# Patient Record
Sex: FEMALE | Race: WHITE | NOT HISPANIC OR LATINO | Employment: FULL TIME | URBAN - METROPOLITAN AREA
[De-identification: names, ages, dates, MRNs, and addresses within clinical notes are randomized per-mention and may not be internally consistent; named-entity substitution may affect disease eponyms.]

---

## 2020-07-08 ENCOUNTER — HISTORICAL (OUTPATIENT)
Dept: URGENT CARE | Facility: CLINIC | Age: 20
End: 2020-07-08

## 2020-07-17 ENCOUNTER — HISTORICAL (OUTPATIENT)
Dept: URGENT CARE | Facility: CLINIC | Age: 20
End: 2020-07-17

## 2020-07-17 LAB
ABS NEUT (OLG): 2.24 X10(3)/MCL (ref 2.1–9.2)
ALBUMIN SERPL-MCNC: 3.9 GM/DL (ref 3.5–5)
ALBUMIN/GLOB SERPL: 1.1 RATIO (ref 1.1–2)
ALP SERPL-CCNC: 64 UNIT/L (ref 40–150)
ALT SERPL-CCNC: 12 UNIT/L (ref 0–55)
AST SERPL-CCNC: 17 UNIT/L (ref 5–34)
BASOPHILS # BLD AUTO: 0.1 X10(3)/MCL (ref 0–0.2)
BASOPHILS NFR BLD AUTO: 1 %
BILIRUB SERPL-MCNC: 0.2 MG/DL
BILIRUB SERPL-MCNC: NEGATIVE MG/DL
BILIRUBIN DIRECT+TOT PNL SERPL-MCNC: 0.1 MG/DL (ref 0–0.5)
BILIRUBIN DIRECT+TOT PNL SERPL-MCNC: 0.1 MG/DL (ref 0–0.8)
BLOOD URINE, POC: NEGATIVE
BUN SERPL-MCNC: 8.6 MG/DL (ref 7–18.7)
CALCIUM SERPL-MCNC: 9.3 MG/DL (ref 8.4–10.2)
CHLORIDE SERPL-SCNC: 103 MMOL/L (ref 98–107)
CLARITY, POC UA: CLEAR
CO2 SERPL-SCNC: 26 MMOL/L (ref 22–29)
COLOR, POC UA: YELLOW
CREAT SERPL-MCNC: 0.68 MG/DL (ref 0.55–1.02)
EOSINOPHIL # BLD AUTO: 0.4 X10(3)/MCL (ref 0–0.9)
EOSINOPHIL NFR BLD AUTO: 8 %
ERYTHROCYTE [DISTWIDTH] IN BLOOD BY AUTOMATED COUNT: 12 % (ref 11.5–17)
GLOBULIN SER-MCNC: 3.4 GM/DL (ref 2.4–3.5)
GLUCOSE SERPL-MCNC: 72 MG/DL (ref 74–100)
GLUCOSE UR QL STRIP: NEGATIVE
HCT VFR BLD AUTO: 41.9 % (ref 37–47)
HGB BLD-MCNC: 13.5 GM/DL (ref 12–16)
HIV 1+2 AB+HIV1 P24 AG SERPL QL IA: NONREACTIVE
KETONES UR QL STRIP: NEGATIVE
LEUKOCYTE EST, POC UA: NEGATIVE
LYMPHOCYTES # BLD AUTO: 1.8 X10(3)/MCL (ref 0.6–4.6)
LYMPHOCYTES NFR BLD AUTO: 36 %
MCH RBC QN AUTO: 28.2 PG (ref 27–31)
MCHC RBC AUTO-ENTMCNC: 32.2 GM/DL (ref 33–36)
MCV RBC AUTO: 87.7 FL (ref 80–94)
MONOCYTES # BLD AUTO: 0.5 X10(3)/MCL (ref 0.1–1.3)
MONOCYTES NFR BLD AUTO: 10 %
NEUTROPHILS # BLD AUTO: 2.24 X10(3)/MCL (ref 2.1–9.2)
NEUTROPHILS NFR BLD AUTO: 44 %
NITRITE, POC UA: NEGATIVE
PH, POC UA: 6
PLATELET # BLD AUTO: 294 X10(3)/MCL (ref 130–400)
PMV BLD AUTO: 9.7 FL (ref 9.4–12.4)
POTASSIUM SERPL-SCNC: 4.1 MMOL/L (ref 3.5–5.1)
PROT SERPL-MCNC: 7.3 GM/DL (ref 6.4–8.3)
PROTEIN, POC: NEGATIVE
RBC # BLD AUTO: 4.78 X10(6)/MCL (ref 4.2–5.4)
SODIUM SERPL-SCNC: 138 MMOL/L (ref 136–145)
SPECIFIC GRAVITY, POC UA: 1.01
TSH SERPL-ACNC: 1.04 UIU/ML (ref 0.35–4.94)
UROBILINOGEN, POC UA: NORMAL
WBC # SPEC AUTO: 5 X10(3)/MCL (ref 4.5–11.5)

## 2020-11-17 LAB — RAPID GROUP A STREP (OHS): NEGATIVE

## 2021-04-15 LAB
RAPID GROUP A STREP (OHS): NEGATIVE
SARS-COV-2 RNA RESP QL NAA+PROBE: NEGATIVE

## 2021-06-30 ENCOUNTER — HISTORICAL (OUTPATIENT)
Dept: ADMINISTRATIVE | Facility: HOSPITAL | Age: 21
End: 2021-06-30

## 2021-06-30 LAB — SARS-COV-2 RNA RESP QL NAA+PROBE: NEGATIVE

## 2021-07-06 ENCOUNTER — HISTORICAL (OUTPATIENT)
Dept: ADMINISTRATIVE | Facility: HOSPITAL | Age: 21
End: 2021-07-06

## 2021-07-06 LAB — SARS-COV-2 RNA RESP QL NAA+PROBE: POSITIVE

## 2021-10-04 ENCOUNTER — HISTORICAL (OUTPATIENT)
Dept: ADMINISTRATIVE | Facility: HOSPITAL | Age: 21
End: 2021-10-04

## 2021-12-18 LAB
INFLUENZA A ANTIGEN, POC: NEGATIVE
INFLUENZA B ANTIGEN, POC: NEGATIVE
RAPID GROUP A STREP (OHS): POSITIVE
SARS-COV-2 RNA RESP QL NAA+PROBE: NEGATIVE

## 2022-04-10 ENCOUNTER — HISTORICAL (OUTPATIENT)
Dept: ADMINISTRATIVE | Facility: HOSPITAL | Age: 22
End: 2022-04-10

## 2022-04-26 VITALS
DIASTOLIC BLOOD PRESSURE: 83 MMHG | BODY MASS INDEX: 19.47 KG/M2 | HEIGHT: 62 IN | WEIGHT: 105.81 LBS | OXYGEN SATURATION: 99 % | SYSTOLIC BLOOD PRESSURE: 117 MMHG

## 2022-06-17 ENCOUNTER — OFFICE VISIT (OUTPATIENT)
Dept: URGENT CARE | Facility: CLINIC | Age: 22
End: 2022-06-17
Payer: COMMERCIAL

## 2022-06-17 VITALS
RESPIRATION RATE: 18 BRPM | WEIGHT: 105 LBS | HEIGHT: 62 IN | TEMPERATURE: 99 F | BODY MASS INDEX: 19.32 KG/M2 | DIASTOLIC BLOOD PRESSURE: 85 MMHG | HEART RATE: 82 BPM | OXYGEN SATURATION: 97 % | SYSTOLIC BLOOD PRESSURE: 126 MMHG

## 2022-06-17 DIAGNOSIS — Z20.828 RSV EXPOSURE: ICD-10-CM

## 2022-06-17 DIAGNOSIS — J02.9 SORE THROAT: Primary | ICD-10-CM

## 2022-06-17 LAB
CTP QC/QA: YES
FLUAV AG NPH QL: NEGATIVE
FLUBV AG NPH QL: NEGATIVE
S PYO RRNA THROAT QL PROBE: NEGATIVE
SARS-COV-2 RDRP RESP QL NAA+PROBE: NEGATIVE

## 2022-06-17 PROCEDURE — 1160F PR REVIEW ALL MEDS BY PRESCRIBER/CLIN PHARMACIST DOCUMENTED: ICD-10-PCS | Mod: CPTII,,, | Performed by: FAMILY MEDICINE

## 2022-06-17 PROCEDURE — U0002 COVID-19 LAB TEST NON-CDC: HCPCS | Mod: QW,,, | Performed by: FAMILY MEDICINE

## 2022-06-17 PROCEDURE — 1160F RVW MEDS BY RX/DR IN RCRD: CPT | Mod: CPTII,,, | Performed by: FAMILY MEDICINE

## 2022-06-17 PROCEDURE — 87880 POCT RAPID STREP A: ICD-10-PCS | Mod: QW,,, | Performed by: FAMILY MEDICINE

## 2022-06-17 PROCEDURE — 87804 INFLUENZA ASSAY W/OPTIC: CPT | Mod: QW,,, | Performed by: FAMILY MEDICINE

## 2022-06-17 PROCEDURE — 3008F BODY MASS INDEX DOCD: CPT | Mod: CPTII,,, | Performed by: FAMILY MEDICINE

## 2022-06-17 PROCEDURE — 3074F SYST BP LT 130 MM HG: CPT | Mod: CPTII,,, | Performed by: FAMILY MEDICINE

## 2022-06-17 PROCEDURE — 1159F PR MEDICATION LIST DOCUMENTED IN MEDICAL RECORD: ICD-10-PCS | Mod: CPTII,,, | Performed by: FAMILY MEDICINE

## 2022-06-17 PROCEDURE — 3008F PR BODY MASS INDEX (BMI) DOCUMENTED: ICD-10-PCS | Mod: CPTII,,, | Performed by: FAMILY MEDICINE

## 2022-06-17 PROCEDURE — 99213 OFFICE O/P EST LOW 20 MIN: CPT | Mod: 25,,, | Performed by: FAMILY MEDICINE

## 2022-06-17 PROCEDURE — 96372 THER/PROPH/DIAG INJ SC/IM: CPT | Mod: ,,, | Performed by: FAMILY MEDICINE

## 2022-06-17 PROCEDURE — 3074F PR MOST RECENT SYSTOLIC BLOOD PRESSURE < 130 MM HG: ICD-10-PCS | Mod: CPTII,,, | Performed by: FAMILY MEDICINE

## 2022-06-17 PROCEDURE — 96372 PR INJECTION,THERAP/PROPH/DIAG2ST, IM OR SUBCUT: ICD-10-PCS | Mod: ,,, | Performed by: FAMILY MEDICINE

## 2022-06-17 PROCEDURE — 3079F PR MOST RECENT DIASTOLIC BLOOD PRESSURE 80-89 MM HG: ICD-10-PCS | Mod: CPTII,,, | Performed by: FAMILY MEDICINE

## 2022-06-17 PROCEDURE — 1159F MED LIST DOCD IN RCRD: CPT | Mod: CPTII,,, | Performed by: FAMILY MEDICINE

## 2022-06-17 PROCEDURE — 3079F DIAST BP 80-89 MM HG: CPT | Mod: CPTII,,, | Performed by: FAMILY MEDICINE

## 2022-06-17 PROCEDURE — 99213 PR OFFICE/OUTPT VISIT, EST, LEVL III, 20-29 MIN: ICD-10-PCS | Mod: 25,,, | Performed by: FAMILY MEDICINE

## 2022-06-17 PROCEDURE — U0002: ICD-10-PCS | Mod: QW,,, | Performed by: FAMILY MEDICINE

## 2022-06-17 PROCEDURE — 87804 POCT INFLUENZA A/B: ICD-10-PCS | Mod: QW,,, | Performed by: FAMILY MEDICINE

## 2022-06-17 PROCEDURE — 87880 STREP A ASSAY W/OPTIC: CPT | Mod: QW,,, | Performed by: FAMILY MEDICINE

## 2022-06-17 RX ORDER — FLUCONAZOLE 150 MG/1
1 TABLET ORAL DAILY
COMMUNITY
Start: 2022-06-14

## 2022-06-17 RX ORDER — RIMEGEPANT SULFATE 75 MG/75MG
75 TABLET, ORALLY DISINTEGRATING ORAL DAILY PRN
COMMUNITY
Start: 2022-03-22 | End: 2023-01-18

## 2022-06-17 RX ORDER — AMITRIPTYLINE HYDROCHLORIDE 10 MG/1
10 TABLET, FILM COATED ORAL NIGHTLY
COMMUNITY
Start: 2022-05-26

## 2022-06-17 RX ORDER — NORETHINDRONE ACETATE AND ETHINYL ESTRADIOL, ETHINYL ESTRADIOL AND FERROUS FUMARATE 1MG-10(24)
1 KIT ORAL DAILY
COMMUNITY
Start: 2022-05-10

## 2022-06-17 RX ORDER — DEXAMETHASONE SODIUM PHOSPHATE 100 MG/10ML
8 INJECTION INTRAMUSCULAR; INTRAVENOUS
Status: COMPLETED | OUTPATIENT
Start: 2022-06-17 | End: 2022-06-17

## 2022-06-17 RX ADMIN — DEXAMETHASONE SODIUM PHOSPHATE 8 MG: 100 INJECTION INTRAMUSCULAR; INTRAVENOUS at 12:06

## 2022-06-17 NOTE — PATIENT INSTRUCTIONS
Strep negative flu negative COVID negative    Start taking an allergy pill daily such as claritin, zyrtec, allegrea or xyzal. Also start using a nasal steroid spray such as flonase or nasacort daily. If you are not being treated for high blood pressure, you can also take decongestant such as sudafed as needed. They can be purchased over the counter.Monitor for fever. Take tylenol/acetaminophen or ibuprofen as needed. Rest and hydrate. If symptoms persist or worsen, return to clinic or seek medical attention immediately.

## 2022-06-17 NOTE — PROGRESS NOTES
"Subjective:       Patient ID: Oskar Cao is a 21 y.o. female.    Vitals:  height is 5' 2.21" (1.58 m) and weight is 47.6 kg (105 lb). Her oral temperature is 98.7 °F (37.1 °C). Her blood pressure is 126/85 and her pulse is 82. Her respiration is 18 and oxygen saturation is 97%.     Chief Complaint: Sore Throat (X 2 days, exposure to strep and flu at ), Headache (X 2 days), Nasal Congestion (X 2 days), Cough (X 2 days), and Fever ((Tmax 99.8) x 2 days)    21 y.o. female presents to clinic c/o HA, sore throat, nasal congestion, cough, fever(tmax99.8) X 3 days, exposure to strep and flu at .  Also reports diarrhea.  Denies any shortness of breath or wheezing.    Sore Throat   This is a new problem. The current episode started in the past 7 days. The problem has been unchanged. The pain is moderate. Associated symptoms include coughing and headaches. She has had exposure to strep. She has tried nothing for the symptoms.   Headache   This is a new problem. The current episode started in the past 7 days. The problem occurs constantly. The problem has been unchanged. The pain is located in the bilateral region. The quality of the pain is described as aching. The pain is mild. Associated symptoms include coughing, a fever and a sore throat.   Cough  This is a new problem. The current episode started in the past 7 days. The problem has been unchanged. The problem occurs every few minutes. The cough is non-productive. Associated symptoms include a fever, headaches and a sore throat. She has tried nothing for the symptoms. Her past medical history is significant for asthma.   Fever   This is a new problem. The current episode started in the past 7 days. The problem occurs intermittently. The problem has been waxing and waning. The maximum temperature noted was 99 to 99.9 F. Associated symptoms include coughing, headaches and a sore throat.       Constitution: Positive for fever.   HENT: Positive for sore " throat.    Cardiovascular: Negative.    Eyes: Negative.    Respiratory: Positive for cough.    Gastrointestinal: Negative.    Genitourinary: Negative.    Musculoskeletal: Negative.    Skin: Negative.    Allergic/Immunologic: Negative.    Neurological: Positive for headaches.   Hematologic/Lymphatic: Negative.        Objective:      Physical Exam   Constitutional: She is oriented to person, place, and time.   HENT:   Head: Normocephalic and atraumatic.   Ears:   Right Ear: Tympanic membrane and external ear normal.   Left Ear: Tympanic membrane and external ear normal.   Mouth/Throat: No posterior oropharyngeal erythema (Postnasal drip is present).   Eyes: Conjunctivae are normal.   Pulmonary/Chest: Effort normal and breath sounds normal. No respiratory distress. She has no wheezes. She has no rhonchi. She has no rales.   Abdominal: Normal appearance.   Neurological: She is alert and oriented to person, place, and time.   Psychiatric: Her behavior is normal. Mood, judgment and thought content normal.   Vitals reviewed.         Previous History      Review of patient's allergies indicates:   Allergen Reactions    Imitrex [sumatriptan]      stroke    Penicillin Hives    Latex Itching and Rash       Past Medical History:   Diagnosis Date    Anemia     Asthma      Current Outpatient Medications   Medication Instructions    amitriptyline (ELAVIL) 10 mg, Oral, Nightly    fluconazole (DIFLUCAN) 150 MG Tab 1 tablet, Oral, Daily    LO LOESTRIN FE 1 mg-10 mcg (24)/10 mcg (2) Tab 1 tablet, Oral, Daily    NURTEC 75 mg, Oral, Daily PRN     Past Surgical History:   Procedure Laterality Date    ADENOIDECTOMY      MYRINGOTOMY W/ TUBES      TONSILLECTOMY      WISDOM TOOTH EXTRACTION       Family History   Problem Relation Age of Onset    Hypertension Father        Social History     Tobacco Use    Smoking status: Never Smoker    Smokeless tobacco: Never Used   Substance Use Topics    Alcohol use: Not Currently         "Physical Exam      Vital Signs Reviewed   /85 (BP Location: Left arm, Patient Position: Sitting)   Pulse 82   Temp 98.7 °F (37.1 °C) (Oral)   Resp 18   Ht 5' 2.21" (1.58 m)   Wt 47.6 kg (105 lb)   LMP  (LMP Unknown)   SpO2 97%   BMI 19.08 kg/m²        Procedures    Procedures     Labs     Results for orders placed or performed in visit on 06/17/22   POCT COVID-19 Rapid Screening   Result Value Ref Range    POC Rapid COVID Negative Negative     Acceptable Yes    POCT Influenza A/B   Result Value Ref Range    Rapid Influenza A Ag Negative Negative    Rapid Influenza B Ag Negative Negative     Acceptable Yes    POCT rapid strep A   Result Value Ref Range    Rapid Strep A Screen Negative Negative     Acceptable Yes          Assessment:       1. Sore throat    2. RSV exposure          Plan:       Strep negative flu negative COVID negative    Start taking an allergy pill daily such as claritin, zyrtec, allegrea or xyzal. Also start using a nasal steroid spray such as flonase or nasacort daily. If you are not being treated for high blood pressure, you can also take decongestant such as sudafed as needed. They can be purchased over the counter.Monitor for fever. Take tylenol/acetaminophen or ibuprofen as needed. Rest and hydrate. If symptoms persist or worsen, return to clinic or seek medical attention immediately.       Sore throat  -     POCT COVID-19 Rapid Screening  -     POCT Influenza A/B  -     POCT rapid strep A    RSV exposure    Other orders  -     dexamethasone injection 8 mg                     "

## 2022-09-16 ENCOUNTER — OFFICE VISIT (OUTPATIENT)
Dept: URGENT CARE | Facility: CLINIC | Age: 22
End: 2022-09-16
Payer: COMMERCIAL

## 2022-09-16 VITALS
TEMPERATURE: 99 F | RESPIRATION RATE: 16 BRPM | OXYGEN SATURATION: 100 % | WEIGHT: 110 LBS | HEIGHT: 62 IN | DIASTOLIC BLOOD PRESSURE: 86 MMHG | BODY MASS INDEX: 20.24 KG/M2 | SYSTOLIC BLOOD PRESSURE: 128 MMHG | HEART RATE: 79 BPM

## 2022-09-16 DIAGNOSIS — J02.9 SORE THROAT: Primary | ICD-10-CM

## 2022-09-16 DIAGNOSIS — J06.9 ACUTE URI: ICD-10-CM

## 2022-09-16 PROCEDURE — 87804 POCT INFLUENZA A/B: ICD-10-PCS | Mod: QW,,, | Performed by: PHYSICIAN ASSISTANT

## 2022-09-16 PROCEDURE — 87804 INFLUENZA ASSAY W/OPTIC: CPT | Mod: QW,,, | Performed by: PHYSICIAN ASSISTANT

## 2022-09-16 PROCEDURE — 99213 OFFICE O/P EST LOW 20 MIN: CPT | Mod: 25,,, | Performed by: PHYSICIAN ASSISTANT

## 2022-09-16 PROCEDURE — 96372 THER/PROPH/DIAG INJ SC/IM: CPT | Mod: ,,, | Performed by: PHYSICIAN ASSISTANT

## 2022-09-16 PROCEDURE — 87880 POCT RAPID STREP A: ICD-10-PCS | Mod: QW,,, | Performed by: PHYSICIAN ASSISTANT

## 2022-09-16 PROCEDURE — 99213 PR OFFICE/OUTPT VISIT, EST, LEVL III, 20-29 MIN: ICD-10-PCS | Mod: 25,,, | Performed by: PHYSICIAN ASSISTANT

## 2022-09-16 PROCEDURE — U0002: ICD-10-PCS | Mod: QW,,, | Performed by: PHYSICIAN ASSISTANT

## 2022-09-16 PROCEDURE — 96372 PR INJECTION,THERAP/PROPH/DIAG2ST, IM OR SUBCUT: ICD-10-PCS | Mod: ,,, | Performed by: PHYSICIAN ASSISTANT

## 2022-09-16 PROCEDURE — 87880 STREP A ASSAY W/OPTIC: CPT | Mod: QW,,, | Performed by: PHYSICIAN ASSISTANT

## 2022-09-16 PROCEDURE — U0002 COVID-19 LAB TEST NON-CDC: HCPCS | Mod: QW,,, | Performed by: PHYSICIAN ASSISTANT

## 2022-09-16 RX ORDER — BETAMETHASONE SODIUM PHOSPHATE AND BETAMETHASONE ACETATE 3; 3 MG/ML; MG/ML
9 INJECTION, SUSPENSION INTRA-ARTICULAR; INTRALESIONAL; INTRAMUSCULAR; SOFT TISSUE
Status: COMPLETED | OUTPATIENT
Start: 2022-09-16 | End: 2022-09-16

## 2022-09-16 RX ADMIN — BETAMETHASONE SODIUM PHOSPHATE AND BETAMETHASONE ACETATE 9 MG: 3; 3 INJECTION, SUSPENSION INTRA-ARTICULAR; INTRALESIONAL; INTRAMUSCULAR; SOFT TISSUE at 03:09

## 2022-09-16 NOTE — PROGRESS NOTES
"Subjective:       Patient ID: Oskar Cao is a 21 y.o. female.    Vitals:  height is 5' 2" (1.575 m) and weight is 49.9 kg (110 lb). Her oral temperature is 98.7 °F (37.1 °C). Her blood pressure is 128/86 and her pulse is 79. Her respiration is 16 and oxygen saturation is 100%.     Chief Complaint: Sore Throat    Sore throat, coughing up mucus, congestion, and chills. X2 days .  Patient denies any neck stiffness rash shortness of breath or GI symptoms.  ROS    Objective:      Physical Exam   Constitutional: She is oriented to person, place, and time. She appears well-developed. She is cooperative.  Non-toxic appearance. She does not appear ill. No distress.   HENT:   Head: Normocephalic and atraumatic.   Ears:   Right Ear: Hearing, tympanic membrane, external ear and ear canal normal.   Left Ear: Hearing, tympanic membrane, external ear and ear canal normal.   Nose: Nose normal. No mucosal edema, rhinorrhea or nasal deformity. No epistaxis. Right sinus exhibits no maxillary sinus tenderness and no frontal sinus tenderness. Left sinus exhibits no maxillary sinus tenderness and no frontal sinus tenderness.   Mouth/Throat: Uvula is midline, oropharynx is clear and moist and mucous membranes are normal. No trismus in the jaw. Normal dentition. No uvula swelling. No oropharyngeal exudate, posterior oropharyngeal edema or posterior oropharyngeal erythema.   Eyes: Conjunctivae and lids are normal. No scleral icterus.   Neck: Trachea normal and phonation normal. Neck supple. No edema present. No erythema present. No neck rigidity present.   Cardiovascular: Normal rate, regular rhythm, normal heart sounds and normal pulses.   Pulmonary/Chest: Effort normal and breath sounds normal. No respiratory distress. She has no decreased breath sounds. She has no rhonchi.   Abdominal: Normal appearance.   Musculoskeletal: Normal range of motion.         General: No deformity. Normal range of motion.   Neurological: She is alert and " "oriented to person, place, and time. She exhibits normal muscle tone. Coordination normal.   Skin: Skin is warm, dry, intact, not diaphoretic and not pale.   Psychiatric: Her speech is normal and behavior is normal. Judgment and thought content normal.   Nursing note and vitals reviewed.             Previous History      Review of patient's allergies indicates:   Allergen Reactions    Imitrex [sumatriptan]      stroke    Penicillin Hives    Latex Itching and Rash       Past Medical History:   Diagnosis Date    Anemia     Asthma      Current Outpatient Medications   Medication Instructions    amitriptyline (ELAVIL) 10 mg, Oral, Nightly    fluconazole (DIFLUCAN) 150 MG Tab 1 tablet, Oral, Daily    LO LOESTRIN FE 1 mg-10 mcg (24)/10 mcg (2) Tab 1 tablet, Oral, Daily    NURTEC 75 mg, Oral, Daily PRN     Past Surgical History:   Procedure Laterality Date    ADENOIDECTOMY      MYRINGOTOMY W/ TUBES      TONSILLECTOMY      WISDOM TOOTH EXTRACTION       Family History   Problem Relation Age of Onset    Hypertension Father        Social History     Tobacco Use    Smoking status: Never    Smokeless tobacco: Never   Substance Use Topics    Alcohol use: Not Currently        Physical Exam      Vital Signs Reviewed   /86   Pulse 79   Temp 98.7 °F (37.1 °C) (Oral)   Resp 16   Ht 5' 2" (1.575 m)   Wt 49.9 kg (110 lb)   SpO2 100%   BMI 20.12 kg/m²        Procedures    Procedures     Labs     Results for orders placed or performed in visit on 09/16/22   POCT rapid strep A   Result Value Ref Range    Rapid Strep A Screen Negative Negative     Acceptable Yes    POCT Influenza A/B   Result Value Ref Range    Rapid Influenza A Ag Negative Negative    Rapid Influenza B Ag Negative Negative     Acceptable Yes    POCT COVID-19 Rapid Screening   Result Value Ref Range    POC Rapid COVID Negative Negative     Acceptable Yes      Assessment:       1. Sore throat    2. Acute URI      "     Plan:         Sore throat  -     POCT rapid strep A  -     POCT Influenza A/B  -     POCT COVID-19 Rapid Screening    Acute URI  -     betamethasone acetate-betamethasone sodium phosphate injection 9 mg       Drink plenty of fluids    Get plenty of rest.    Follow-up with your primary care doctor      Go to emergency department with any significant change or worsening symptoms.    Tylenol or Motrin as needed for fever.

## 2022-09-21 ENCOUNTER — HISTORICAL (OUTPATIENT)
Dept: ADMINISTRATIVE | Facility: HOSPITAL | Age: 22
End: 2022-09-21
Payer: COMMERCIAL

## 2022-10-06 ENCOUNTER — OFFICE VISIT (OUTPATIENT)
Dept: URGENT CARE | Facility: CLINIC | Age: 22
End: 2022-10-06
Payer: COMMERCIAL

## 2022-10-06 VITALS
RESPIRATION RATE: 18 BRPM | OXYGEN SATURATION: 96 % | TEMPERATURE: 99 F | BODY MASS INDEX: 21.16 KG/M2 | WEIGHT: 115 LBS | HEART RATE: 84 BPM | HEIGHT: 62 IN | SYSTOLIC BLOOD PRESSURE: 121 MMHG | DIASTOLIC BLOOD PRESSURE: 80 MMHG

## 2022-10-06 DIAGNOSIS — L30.9 DERMATITIS: Primary | ICD-10-CM

## 2022-10-06 PROCEDURE — 1160F RVW MEDS BY RX/DR IN RCRD: CPT | Mod: CPTII,,, | Performed by: FAMILY MEDICINE

## 2022-10-06 PROCEDURE — 3008F BODY MASS INDEX DOCD: CPT | Mod: CPTII,,, | Performed by: FAMILY MEDICINE

## 2022-10-06 PROCEDURE — 1160F PR REVIEW ALL MEDS BY PRESCRIBER/CLIN PHARMACIST DOCUMENTED: ICD-10-PCS | Mod: CPTII,,, | Performed by: FAMILY MEDICINE

## 2022-10-06 PROCEDURE — 3079F DIAST BP 80-89 MM HG: CPT | Mod: CPTII,,, | Performed by: FAMILY MEDICINE

## 2022-10-06 PROCEDURE — 99213 OFFICE O/P EST LOW 20 MIN: CPT | Mod: 25,,, | Performed by: FAMILY MEDICINE

## 2022-10-06 PROCEDURE — 3074F PR MOST RECENT SYSTOLIC BLOOD PRESSURE < 130 MM HG: ICD-10-PCS | Mod: CPTII,,, | Performed by: FAMILY MEDICINE

## 2022-10-06 PROCEDURE — 1159F MED LIST DOCD IN RCRD: CPT | Mod: CPTII,,, | Performed by: FAMILY MEDICINE

## 2022-10-06 PROCEDURE — 99213 PR OFFICE/OUTPT VISIT, EST, LEVL III, 20-29 MIN: ICD-10-PCS | Mod: 25,,, | Performed by: FAMILY MEDICINE

## 2022-10-06 PROCEDURE — 3008F PR BODY MASS INDEX (BMI) DOCUMENTED: ICD-10-PCS | Mod: CPTII,,, | Performed by: FAMILY MEDICINE

## 2022-10-06 PROCEDURE — 96372 PR INJECTION,THERAP/PROPH/DIAG2ST, IM OR SUBCUT: ICD-10-PCS | Mod: ,,, | Performed by: FAMILY MEDICINE

## 2022-10-06 PROCEDURE — 3079F PR MOST RECENT DIASTOLIC BLOOD PRESSURE 80-89 MM HG: ICD-10-PCS | Mod: CPTII,,, | Performed by: FAMILY MEDICINE

## 2022-10-06 PROCEDURE — 3074F SYST BP LT 130 MM HG: CPT | Mod: CPTII,,, | Performed by: FAMILY MEDICINE

## 2022-10-06 PROCEDURE — 96372 THER/PROPH/DIAG INJ SC/IM: CPT | Mod: ,,, | Performed by: FAMILY MEDICINE

## 2022-10-06 PROCEDURE — 1159F PR MEDICATION LIST DOCUMENTED IN MEDICAL RECORD: ICD-10-PCS | Mod: CPTII,,, | Performed by: FAMILY MEDICINE

## 2022-10-06 RX ORDER — PREDNISONE 10 MG/1
30 TABLET ORAL DAILY
Qty: 15 TABLET | Refills: 0 | Status: SHIPPED | OUTPATIENT
Start: 2022-10-06 | End: 2022-10-11

## 2022-10-06 RX ORDER — DEXAMETHASONE SODIUM PHOSPHATE 100 MG/10ML
8 INJECTION INTRAMUSCULAR; INTRAVENOUS
Status: COMPLETED | OUTPATIENT
Start: 2022-10-06 | End: 2022-10-06

## 2022-10-06 RX ADMIN — DEXAMETHASONE SODIUM PHOSPHATE 8 MG: 100 INJECTION INTRAMUSCULAR; INTRAVENOUS at 10:10

## 2022-10-06 NOTE — LETTER
October 6, 2022      [place] [place] [facility]  [address] [address]  Phone: [phone]       Patient: Oskar Cao   YOB: 2000  Date of Visit: 10/06/2022    To Whom It May Concern:    Jermaine Cao  was at [facility] on 10/06/2022. The patient may return to work/school on 10/07/2022 with no restrictions. If you have any questions or concerns, or if I can be of further assistance, please do not hesitate to contact me.    Sincerely,    Ema Stein MA

## 2022-10-06 NOTE — PATIENT INSTRUCTIONS
Possible atopic dermatitis or contact dermatitis from sugar cane burning and/or poison ivy.  Medications sent to pharmacy.  Start the oral steroids tomorrow morning.  Recommend taking Zyrtec or Claritin in the morning and Benadryl at bedtime.  Recommend avoid putting makeup on or anything that may aggravate the rash on her face.  If there is no improvement in 3 or 4 days notify us and we will refer you to Dermatology

## 2022-10-06 NOTE — PROGRESS NOTES
"Subjective:       Patient ID: Oskar Cao is a 21 y.o. female.    Vitals:  height is 5' 2" (1.575 m) and weight is 52.2 kg (115 lb). Her temperature is 98.5 °F (36.9 °C). Her blood pressure is 121/80 and her pulse is 84. Her respiration is 18 and oxygen saturation is 96%.     Chief Complaint: Rash    21-year-old female presents to clinic complaining of a red bumpy rash to the face that began yesterday.  States it did start to itch and there was some burning sensation but it is now starting to improve.  Patient denies any shortness of breath wheezing lip swell Or fever.  Patient states she does have some allergy issues when the start burning sugar cane.  May have also been exposed to poison ivy.      Constitution: Negative.   HENT: Negative.     Cardiovascular: Negative.    Eyes: Negative.    Respiratory: Negative.     Gastrointestinal: Negative.    Genitourinary: Negative.    Musculoskeletal: Negative.    Skin:  Positive for rash.   Allergic/Immunologic: Negative.    Neurological: Negative.    Hematologic/Lymphatic: Negative.      Objective:      Physical Exam   Constitutional: She is oriented to person, place, and time.  Non-toxic appearance. She does not appear ill. No distress. normal  HENT:   Head: Normocephalic and atraumatic.   Mouth/Throat: No posterior oropharyngeal erythema (no lip swelling or tongue swelling). Oropharynx is clear.   Pulmonary/Chest: Effort normal.   Abdominal: Normal appearance.   Neurological: She is alert and oriented to person, place, and time.   Skin: Skin is not diaphoretic and rash (there are a few erythemic papules on the face).   Psychiatric: Her behavior is normal. Mood, judgment and thought content normal.   Vitals reviewed.         Previous History      Review of patient's allergies indicates:   Allergen Reactions    Imitrex [sumatriptan]      stroke    Penicillin Hives    Latex Itching and Rash       Past Medical History:   Diagnosis Date    Anemia     Asthma     Migraines  " "    Current Outpatient Medications   Medication Instructions    amitriptyline (ELAVIL) 10 mg, Oral, Nightly    fluconazole (DIFLUCAN) 150 MG Tab 1 tablet, Oral, Daily    LO LOESTRIN FE 1 mg-10 mcg (24)/10 mcg (2) Tab 1 tablet, Oral, Daily    NURTEC 75 mg, Oral, Daily PRN    predniSONE (DELTASONE) 30 mg, Oral, Daily     Past Surgical History:   Procedure Laterality Date    ADENOIDECTOMY      MYRINGOTOMY W/ TUBES      TONSILLECTOMY      WISDOM TOOTH EXTRACTION       Family History   Problem Relation Age of Onset    Hypertension Father        Social History     Tobacco Use    Smoking status: Never    Smokeless tobacco: Never   Substance Use Topics    Alcohol use: Not Currently        Physical Exam      Vital Signs Reviewed   /80   Pulse 84   Temp 98.5 °F (36.9 °C)   Resp 18   Ht 5' 2" (1.575 m)   Wt 52.2 kg (115 lb)   SpO2 96%   BMI 21.03 kg/m²        Procedures    Procedures     Labs     Results for orders placed or performed in visit on 09/21/22   COVID-19 Routine Screening   Result Value Ref Range    SARS-CoV-2 PCR Negative    POCT rapid strep A   Result Value Ref Range    Rapid Group A Strep Negative        Assessment:       1. Dermatitis          Plan:       Possible atopic dermatitis or contact dermatitis from sugar cane burning and/or poison ivy.  Medications sent to pharmacy.  Start the oral steroids tomorrow morning.  Recommend taking Zyrtec or Claritin in the morning and Benadryl at bedtime.  Recommend avoid putting makeup on or anything that may aggravate the rash on her face.  If there is no improvement in 3 or 4 days notify us and we will refer you to Dermatology  Dermatitis    Other orders  -     dexamethasone injection 8 mg  -     predniSONE (DELTASONE) 10 MG tablet; Take 3 tablets (30 mg total) by mouth once daily. for 5 days  Dispense: 15 tablet; Refill: 0                   "

## 2022-10-24 ENCOUNTER — OFFICE VISIT (OUTPATIENT)
Dept: URGENT CARE | Facility: CLINIC | Age: 22
End: 2022-10-24
Payer: COMMERCIAL

## 2022-10-24 VITALS
TEMPERATURE: 99 F | SYSTOLIC BLOOD PRESSURE: 119 MMHG | OXYGEN SATURATION: 98 % | RESPIRATION RATE: 18 BRPM | BODY MASS INDEX: 21.16 KG/M2 | DIASTOLIC BLOOD PRESSURE: 83 MMHG | HEART RATE: 110 BPM | HEIGHT: 62 IN | WEIGHT: 115 LBS

## 2022-10-24 DIAGNOSIS — J02.9 SORE THROAT: Primary | ICD-10-CM

## 2022-10-24 DIAGNOSIS — B34.9 ACUTE VIRAL SYNDROME: ICD-10-CM

## 2022-10-24 LAB
CTP QC/QA: YES
MOLECULAR STREP A: NEGATIVE
POC MOLECULAR INFLUENZA A AGN: NEGATIVE
POC MOLECULAR INFLUENZA B AGN: NEGATIVE
SARS-COV-2 RDRP RESP QL NAA+PROBE: NEGATIVE

## 2022-10-24 PROCEDURE — 87502 INFLUENZA DNA AMP PROBE: CPT | Mod: QW,,, | Performed by: PHYSICIAN ASSISTANT

## 2022-10-24 PROCEDURE — 87651 POCT STREP A MOLECULAR: ICD-10-PCS | Mod: QW,,, | Performed by: PHYSICIAN ASSISTANT

## 2022-10-24 PROCEDURE — 87635: ICD-10-PCS | Mod: QW,,, | Performed by: PHYSICIAN ASSISTANT

## 2022-10-24 PROCEDURE — 1159F PR MEDICATION LIST DOCUMENTED IN MEDICAL RECORD: ICD-10-PCS | Mod: CPTII,,, | Performed by: PHYSICIAN ASSISTANT

## 2022-10-24 PROCEDURE — 87651 STREP A DNA AMP PROBE: CPT | Mod: QW,,, | Performed by: PHYSICIAN ASSISTANT

## 2022-10-24 PROCEDURE — 3074F PR MOST RECENT SYSTOLIC BLOOD PRESSURE < 130 MM HG: ICD-10-PCS | Mod: CPTII,,, | Performed by: PHYSICIAN ASSISTANT

## 2022-10-24 PROCEDURE — 3079F DIAST BP 80-89 MM HG: CPT | Mod: CPTII,,, | Performed by: PHYSICIAN ASSISTANT

## 2022-10-24 PROCEDURE — 87502 POCT INFLUENZA A/B MOLECULAR: ICD-10-PCS | Mod: QW,,, | Performed by: PHYSICIAN ASSISTANT

## 2022-10-24 PROCEDURE — 1160F PR REVIEW ALL MEDS BY PRESCRIBER/CLIN PHARMACIST DOCUMENTED: ICD-10-PCS | Mod: CPTII,,, | Performed by: PHYSICIAN ASSISTANT

## 2022-10-24 PROCEDURE — 3079F PR MOST RECENT DIASTOLIC BLOOD PRESSURE 80-89 MM HG: ICD-10-PCS | Mod: CPTII,,, | Performed by: PHYSICIAN ASSISTANT

## 2022-10-24 PROCEDURE — 1160F RVW MEDS BY RX/DR IN RCRD: CPT | Mod: CPTII,,, | Performed by: PHYSICIAN ASSISTANT

## 2022-10-24 PROCEDURE — 87635 SARS-COV-2 COVID-19 AMP PRB: CPT | Mod: QW,,, | Performed by: PHYSICIAN ASSISTANT

## 2022-10-24 PROCEDURE — 3074F SYST BP LT 130 MM HG: CPT | Mod: CPTII,,, | Performed by: PHYSICIAN ASSISTANT

## 2022-10-24 PROCEDURE — 99214 PR OFFICE/OUTPT VISIT, EST, LEVL IV, 30-39 MIN: ICD-10-PCS | Mod: ,,, | Performed by: PHYSICIAN ASSISTANT

## 2022-10-24 PROCEDURE — 1159F MED LIST DOCD IN RCRD: CPT | Mod: CPTII,,, | Performed by: PHYSICIAN ASSISTANT

## 2022-10-24 PROCEDURE — 99214 OFFICE O/P EST MOD 30 MIN: CPT | Mod: ,,, | Performed by: PHYSICIAN ASSISTANT

## 2022-10-24 RX ORDER — PROMETHAZINE HYDROCHLORIDE 25 MG/1
25 TABLET ORAL EVERY 6 HOURS PRN
Qty: 20 TABLET | Refills: 0 | Status: SHIPPED | OUTPATIENT
Start: 2022-10-24 | End: 2022-10-29

## 2022-10-24 RX ORDER — OSELTAMIVIR PHOSPHATE 75 MG/1
75 CAPSULE ORAL 2 TIMES DAILY
Qty: 10 CAPSULE | Refills: 0 | Status: SHIPPED | OUTPATIENT
Start: 2022-10-24 | End: 2022-10-29

## 2022-10-24 NOTE — PROGRESS NOTES
"Subjective:       Patient ID: Oskar Cao is a 21 y.o. female.    Vitals:  height is 5' 2" (1.575 m) and weight is 52.2 kg (115 lb). Her oral temperature is 98.9 °F (37.2 °C). Her blood pressure is 119/83 and her pulse is 110. Her respiration is 18 and oxygen saturation is 98%.     Chief Complaint: Sinus Problem (Pt symptoms started this morning, fever, body aches, chills, sore throat, headache, and nausea. Nurtec, Zofran, Advil, otc flu medication. )    HPI  Pt with acute URI symptoms presents to clin along with boyfriend also sick having flu sick contacts at work and party.   Sinus Problem     Additional comments: Pt symptoms started this morning, fever, body aches,   chills, sore throat, headache, and nausea. Nurtec, Zofran, Advil, otc flu   medication.     Sinus Problem  This is a new problem. Associated symptoms include chills, congestion, coughing and headaches. Pertinent negatives include no ear pain, neck pain, shortness of breath, sinus pressure or sore throat.     Constitution: Positive for chills, fatigue and fever.   HENT:  Positive for congestion. Negative for ear pain, sinus pain, sinus pressure, sore throat, trouble swallowing and voice change.    Neck: Negative for neck pain and neck swelling.   Cardiovascular:  Negative for chest pain.   Respiratory:  Positive for cough. Negative for shortness of breath, stridor and wheezing.    Gastrointestinal:  Positive for nausea. Negative for vomiting and diarrhea.   Musculoskeletal:  Positive for muscle ache. Negative for pain, joint pain and back pain.   Skin: Negative.    Allergic/Immunologic: Negative.    Neurological:  Positive for headaches. Negative for altered mental status.   Psychiatric/Behavioral:  Negative for altered mental status.      Objective:      Physical Exam   Constitutional: She is oriented to person, place, and time. She appears well-developed. She is cooperative.  Non-toxic appearance. She does not appear ill. No distress.      " Comments:Awake alert ambulatory nasally congested female     HENT:   Head: Normocephalic.   Ears:   Right Ear: Hearing, tympanic membrane, external ear and ear canal normal.   Left Ear: Hearing, tympanic membrane, external ear and ear canal normal.   Nose: Congestion present. No mucosal edema, rhinorrhea or nasal deformity. No epistaxis. Right sinus exhibits no maxillary sinus tenderness and no frontal sinus tenderness. Left sinus exhibits no maxillary sinus tenderness and no frontal sinus tenderness.   Mouth/Throat: Uvula is midline, oropharynx is clear and moist and mucous membranes are normal. Mucous membranes are moist. No trismus in the jaw. Normal dentition. No uvula swelling. No oropharyngeal exudate, posterior oropharyngeal edema or posterior oropharyngeal erythema.   Eyes: Conjunctivae and lids are normal. No scleral icterus.   Neck: Trachea normal and phonation normal. Neck supple. No edema present. No erythema present. No neck rigidity present.   Cardiovascular: Normal rate, regular rhythm, normal heart sounds and normal pulses.   Pulmonary/Chest: Effort normal and breath sounds normal. No stridor. No respiratory distress. She has no decreased breath sounds. She has no wheezes. She has no rhonchi. She has no rales.   Abdominal: Normal appearance. She exhibits no distension. flat abdomen There is no abdominal tenderness.   Musculoskeletal: Normal range of motion.         General: Normal range of motion.      Cervical back: She exhibits no tenderness.   Lymphadenopathy:     She has no cervical adenopathy.   Neurological: no focal deficit. She is alert and oriented to person, place, and time. She exhibits normal muscle tone. Coordination normal.   Skin: Skin is warm, dry, intact, not diaphoretic, not pale and no rash.   Psychiatric: Her speech is normal and behavior is normal. Mood, judgment and thought content normal.   Nursing note and vitals reviewed.         Previous History      Review of patient's  "allergies indicates:   Allergen Reactions    Imitrex [sumatriptan]      stroke    Penicillin Hives    Latex Itching and Rash       Past Medical History:   Diagnosis Date    Anemia     Asthma     Migraines      Current Outpatient Medications   Medication Instructions    amitriptyline (ELAVIL) 10 mg, Oral, Nightly    fluconazole (DIFLUCAN) 150 MG Tab 1 tablet, Oral, Daily    LO LOESTRIN FE 1 mg-10 mcg (24)/10 mcg (2) Tab 1 tablet, Oral, Daily    NURTEC 75 mg, Oral, Daily PRN    oseltamivir (TAMIFLU) 75 mg, Oral, 2 times daily    promethazine (PHENERGAN) 25 mg, Oral, Every 6 hours PRN     Past Surgical History:   Procedure Laterality Date    ADENOIDECTOMY      MYRINGOTOMY W/ TUBES      TONSILLECTOMY      WISDOM TOOTH EXTRACTION       Family History   Problem Relation Age of Onset    Hypertension Father        Social History     Tobacco Use    Smoking status: Never    Smokeless tobacco: Never   Substance Use Topics    Alcohol use: Not Currently    Drug use: Never        Physical Exam      Vital Signs Reviewed   /83   Pulse 110   Temp 98.9 °F (37.2 °C) (Oral)   Resp 18   Ht 5' 2" (1.575 m)   Wt 52.2 kg (115 lb)   LMP  (LMP Unknown)   SpO2 98%   BMI 21.03 kg/m²        Procedures    Procedures     Labs     Results for orders placed or performed in visit on 10/24/22   POCT COVID-19 Rapid Screening   Result Value Ref Range    POC Rapid COVID Negative Negative     Acceptable Yes    POCT Strep A, Molecular   Result Value Ref Range    Molecular Strep A, POC Negative Negative     Acceptable Yes    POCT Influenza A/B MOLECULAR   Result Value Ref Range    POC Molecular Influenza A Ag Negative Negative, Not Reported    POC Molecular Influenza B Ag Negative Negative, Not Reported     Acceptable Yes    ]  Assessment:       1. Sore throat    2. Acute viral syndrome            Plan:     Any Tylenol and ibuprofen every 4-6 hours as needed for aches pains fever " chills    Phenergan if needed for nausea vomiting or rest.    Start Tamiflu to help reduce viral sick time.  Recommend follow-up with primary care physician in 3-5 days for re-evaluation if not improving.    Sore throat  -     POCT COVID-19 Rapid Screening  -     POCT Strep A, Molecular  -     POCT Influenza A/B MOLECULAR    Acute viral syndrome  -     promethazine (PHENERGAN) 25 MG tablet; Take 1 tablet (25 mg total) by mouth every 6 (six) hours as needed for Nausea (body aches).  Dispense: 20 tablet; Refill: 0  -     oseltamivir (TAMIFLU) 75 MG capsule; Take 1 capsule (75 mg total) by mouth 2 (two) times daily. for 5 days  Dispense: 10 capsule; Refill: 0

## 2022-11-09 ENCOUNTER — OFFICE VISIT (OUTPATIENT)
Dept: URGENT CARE | Facility: CLINIC | Age: 22
End: 2022-11-09
Payer: OTHER MISCELLANEOUS

## 2022-11-09 VITALS
HEIGHT: 62 IN | TEMPERATURE: 100 F | HEART RATE: 85 BPM | SYSTOLIC BLOOD PRESSURE: 131 MMHG | WEIGHT: 115 LBS | OXYGEN SATURATION: 98 % | BODY MASS INDEX: 21.16 KG/M2 | DIASTOLIC BLOOD PRESSURE: 80 MMHG

## 2022-11-09 DIAGNOSIS — M79.645 THUMB PAIN, LEFT: ICD-10-CM

## 2022-11-09 DIAGNOSIS — R53.83 FATIGUE, UNSPECIFIED TYPE: Primary | ICD-10-CM

## 2022-11-09 DIAGNOSIS — J02.0 STREP PHARYNGITIS: ICD-10-CM

## 2022-11-09 LAB
CTP QC/QA: YES
FLUAV AG NPH QL: NEGATIVE
FLUBV AG NPH QL: NEGATIVE
S PYO RRNA THROAT QL PROBE: POSITIVE
SARS-COV-2 RDRP RESP QL NAA+PROBE: NEGATIVE

## 2022-11-09 PROCEDURE — 87804 INFLUENZA ASSAY W/OPTIC: CPT | Mod: QW,,,

## 2022-11-09 PROCEDURE — 87880 STREP A ASSAY W/OPTIC: CPT | Mod: QW,,,

## 2022-11-09 PROCEDURE — 99213 OFFICE O/P EST LOW 20 MIN: CPT | Mod: ,,,

## 2022-11-09 PROCEDURE — 99213 PR OFFICE/OUTPT VISIT, EST, LEVL III, 20-29 MIN: ICD-10-PCS | Mod: ,,,

## 2022-11-09 PROCEDURE — 87880 POCT RAPID STREP A: ICD-10-PCS | Mod: QW,,,

## 2022-11-09 PROCEDURE — 87635 SARS-COV-2 COVID-19 AMP PRB: CPT | Mod: QW,,,

## 2022-11-09 PROCEDURE — 87635: ICD-10-PCS | Mod: QW,,,

## 2022-11-09 PROCEDURE — 87804 POCT INFLUENZA A/B: ICD-10-PCS | Mod: 59,QW,,

## 2022-11-09 RX ORDER — IBUPROFEN 600 MG/1
600 TABLET ORAL 3 TIMES DAILY
Qty: 9 TABLET | Refills: 0 | Status: SHIPPED | OUTPATIENT
Start: 2022-11-09 | End: 2022-11-12

## 2022-11-09 RX ORDER — AZITHROMYCIN 250 MG/1
TABLET, FILM COATED ORAL
Qty: 6 TABLET | Refills: 0 | Status: SHIPPED | OUTPATIENT
Start: 2022-11-09 | End: 2022-11-14

## 2022-11-09 NOTE — PROGRESS NOTES
"Subjective:       Patient ID: Oskar Cao is a 21 y.o. female.    Vitals:  height is 5' 2" (1.575 m) and weight is 52.2 kg (115 lb). Her temperature is 99.9 °F (37.7 °C). Her blood pressure is 131/80 and her pulse is 85. Her oxygen saturation is 98%.     Chief Complaint: left hand pain     A 21 y.o. female present to clinic with left thumb pain at the knuckle after someone sat on it today at work. Pt also c/o body aches, chills, fatigue, temp 99.8 since this morning. Pt exposed to strep. She denies any sob, cp, n/v/d, abdominal complaints, rash, difficulty swallowing, neck stiffness, or changes in intake or output.       Constitution: Positive for chills, fatigue and fever.   HENT: Negative.  Negative for sore throat and trouble swallowing.    Neck: neck negative.   Cardiovascular: Negative.    Eyes: Negative.    Respiratory: Negative.     Gastrointestinal: Negative.    Genitourinary: Negative.    Musculoskeletal:  Positive for pain, joint swelling, abnormal ROM of joint and muscle ache.   Skin: Negative.      Objective:      Physical Exam   Constitutional: She is oriented to person, place, and time. She appears well-developed. She is cooperative.  Non-toxic appearance. She does not appear ill. No distress.   HENT:   Head: Normocephalic and atraumatic.   Ears:   Right Ear: Hearing and external ear normal.   Left Ear: Hearing and external ear normal.   Nose: Nose normal. No mucosal edema, rhinorrhea or nasal deformity. No epistaxis. Right sinus exhibits no maxillary sinus tenderness and no frontal sinus tenderness. Left sinus exhibits no maxillary sinus tenderness and no frontal sinus tenderness.   Mouth/Throat: Uvula is midline and mucous membranes are normal. No trismus in the jaw. Normal dentition. No uvula swelling. Posterior oropharyngeal erythema present. No oropharyngeal exudate or posterior oropharyngeal edema.   Eyes: Conjunctivae and lids are normal.   Neck: Trachea normal and phonation normal. Neck " supple. No edema present. No erythema present. No neck rigidity present.   Cardiovascular: Normal rate.   Pulmonary/Chest: Effort normal and breath sounds normal. No respiratory distress. She has no decreased breath sounds. She has no wheezes.   Abdominal: Normal appearance.   Musculoskeletal:      Left hand: She exhibits decreased range of motion (left thumb, decreased active ROM, with flextion and extension), tenderness (negative for snuff box tenderness) and swelling (left thumb).   Neurological: She is alert and oriented to person, place, and time. She exhibits normal muscle tone.   Skin: Skin is warm, dry, intact, not diaphoretic and no rash.   Psychiatric: Her speech is normal and behavior is normal. Mood and thought content normal.   Nursing note and vitals reviewed.      Assessment:       1. Fatigue, unspecified type    2. Strep pharyngitis    3. Thumb pain, left            Plan:         Fatigue, unspecified type  -     POCT COVID-19 Rapid Screening  -     POCT rapid strep A  -     POCT Influenza A/B  -     XR HAND COMPLETE 3 VIEW LEFT; Future; Expected date: 11/09/2022    Strep pharyngitis  -     azithromycin (Z-ANNA) 250 MG tablet; Take 2 tablets by mouth on day 1; Take 1 tablet by mouth on days 2-5  Dispense: 6 tablet; Refill: 0    Thumb pain, left  Preliminary xray read negative; will call with official read for any changes     Wear the thumb spica splint for comfort for 7-10 days. If pain persist or does not improve, call the clinic and we can refer you to orthopedics     For any fever, increased pain or swelling, decrease in range of motion, or temperature changes to the extremity seek care immediately     Strep pharyngitis   Covid, flu negative; Strep positive      Medications sent to pharmacy  Take all of your antibiotic even if you feel better  You can return to school or work after 24 hours of antibiotics   Monitor for fever  Tylenol or ibuprofen as needed  Warm saltwater gargles  Do not share any  food cups drinks or utensils with anybody.  Change your toothbrush after 2 days of antibiotics  Hydrate  Return to clinic or seek medical attention immediately if his symptoms persist or worsen

## 2022-11-09 NOTE — PATIENT INSTRUCTIONS
Medications sent to pharmacy  Take all of your antibiotic even if you feel better  You can return to school or work after 24 hours of antibiotics   Monitor for fever  Tylenol or ibuprofen as needed  Warm saltwater gargles  Do not share any food cups drinks or utensils with anybody.  Change your toothbrush after 2 days of antibiotics  Hydrate  Return to clinic or seek medical attention immediately if his symptoms persist or worsen      Thumb pain  Preliminary xray read negative; will call with official read for any changes     Wear the thumb spica splint for comfort for 7-10 days. If pain persist or does not improve, call the clinic and we can refer you to orthopedics     For any fever, increased pain or swelling, decrease in range of motion, or temperature changes to the extremity seek care immediately

## 2022-11-21 ENCOUNTER — TELEPHONE (OUTPATIENT)
Dept: URGENT CARE | Facility: CLINIC | Age: 22
End: 2022-11-21
Payer: COMMERCIAL

## 2022-11-21 DIAGNOSIS — M79.645 THUMB PAIN, LEFT: Primary | ICD-10-CM

## 2022-11-21 NOTE — TELEPHONE ENCOUNTER
Pt was seen in clinic on 11/9/22 for hand pain related to work injury. Pt called today stating she was told by Kym Tai NP that if pain persists she would send referral to ortho. Please Advise

## 2022-11-28 ENCOUNTER — LAB VISIT (OUTPATIENT)
Dept: LAB | Facility: HOSPITAL | Age: 22
End: 2022-11-28
Attending: PHYSICIAN ASSISTANT
Payer: COMMERCIAL

## 2022-11-28 DIAGNOSIS — R89.9 ABNORMAL PLEURAL FLUID: Primary | ICD-10-CM

## 2022-11-28 DIAGNOSIS — R21 RASH: ICD-10-CM

## 2022-11-28 DIAGNOSIS — K59.00 CONSTIPATION, UNSPECIFIED CONSTIPATION TYPE: ICD-10-CM

## 2022-11-28 DIAGNOSIS — Z86.2 PERSONAL HISTORY OF DISEASES OF BLOOD AND BLOOD-FORMING ORGANS: ICD-10-CM

## 2022-11-28 LAB — IGA SERPL-MCNC: 180 MG/DL (ref 65–421)

## 2022-11-28 PROCEDURE — 36415 COLL VENOUS BLD VENIPUNCTURE: CPT

## 2022-11-28 PROCEDURE — 82784 ASSAY IGA/IGD/IGG/IGM EACH: CPT

## 2022-11-28 PROCEDURE — 83516 IMMUNOASSAY NONANTIBODY: CPT

## 2022-11-28 PROCEDURE — 86231 EMA EACH IG CLASS: CPT

## 2022-11-28 PROCEDURE — 86480 TB TEST CELL IMMUN MEASURE: CPT

## 2022-11-29 ENCOUNTER — TELEPHONE (OUTPATIENT)
Dept: URGENT CARE | Facility: CLINIC | Age: 22
End: 2022-11-29
Payer: COMMERCIAL

## 2022-11-30 LAB
GAMMA INTERFERON BACKGROUND BLD IA-ACNC: 0.16 IU/ML
M TB IFN-G BLD-IMP: POSITIVE
M TB IFN-G CD4+ BCKGRND COR BLD-ACNC: 1.27 IU/ML
M TB IFN-G CD4+CD8+ BCKGRND COR BLD-ACNC: 1.32 IU/ML
MITOGEN IGNF BCKGRD COR BLD-ACNC: 9.84 IU/ML

## 2022-11-30 NOTE — TELEPHONE ENCOUNTER
11/29/2022 Pt was referred by our office to [facility] d/t injury to her thumb that occurred at work. Dee at [facility] as well as the pt, Oskar Cao, called today to advise that we needed to fax chart notes from pt visit and referral to Lynette at [employer]. Claim #: [id number]. Fax: [phone] Ph: [phone]  Email: [email or web address].   Chart notes and referral were faxed to Lynette at [employer], successful fax confirmation received and scanned to chart. Pt advised that chart notes and referral were faxed to Lynette at [employer], verbalized thanks and understanding.-BW

## 2022-12-01 LAB
ELIA CELIKEY IGA (TTG IGA): NEGATIVE
ENDOMYSIUM IGA SER QL IF: NEGATIVE

## 2022-12-06 LAB
GLIADIN IGA DEAMINATED (OHS): NEGATIVE UNIT/ML
GLIADIN IGG DEAMINATED (OHS): NEGATIVE

## 2022-12-07 ENCOUNTER — HOSPITAL ENCOUNTER (OUTPATIENT)
Dept: RADIOLOGY | Facility: CLINIC | Age: 22
Discharge: HOME | End: 2022-12-07
Attending: STUDENT IN AN ORGANIZED HEALTH CARE EDUCATION/TRAINING PROGRAM
Payer: COMMERCIAL

## 2022-12-07 ENCOUNTER — OFFICE VISIT (OUTPATIENT)
Dept: ORTHOPEDICS | Facility: CLINIC | Age: 22
End: 2022-12-07
Payer: OTHER MISCELLANEOUS

## 2022-12-07 DIAGNOSIS — S63.642A RUPTURE OF RADIAL COLLATERAL LIGAMENT OF LEFT THUMB, INITIAL ENCOUNTER: ICD-10-CM

## 2022-12-07 DIAGNOSIS — M79.645 CHRONIC PAIN OF LEFT THUMB: Primary | ICD-10-CM

## 2022-12-07 DIAGNOSIS — G89.29 CHRONIC PAIN OF LEFT THUMB: Primary | ICD-10-CM

## 2022-12-07 DIAGNOSIS — M79.645 CHRONIC PAIN OF LEFT THUMB: ICD-10-CM

## 2022-12-07 DIAGNOSIS — G89.29 CHRONIC PAIN OF LEFT THUMB: ICD-10-CM

## 2022-12-07 PROCEDURE — 99203 PR OFFICE/OUTPT VISIT, NEW, LEVL III, 30-44 MIN: ICD-10-PCS | Mod: ,,, | Performed by: STUDENT IN AN ORGANIZED HEALTH CARE EDUCATION/TRAINING PROGRAM

## 2022-12-07 PROCEDURE — 99203 OFFICE O/P NEW LOW 30 MIN: CPT | Mod: ,,, | Performed by: STUDENT IN AN ORGANIZED HEALTH CARE EDUCATION/TRAINING PROGRAM

## 2022-12-07 PROCEDURE — 73130 X-RAY EXAM OF HAND: CPT | Mod: LT,,, | Performed by: STUDENT IN AN ORGANIZED HEALTH CARE EDUCATION/TRAINING PROGRAM

## 2022-12-07 PROCEDURE — 73130 XR HAND COMPLETE 3 VIEW LEFT: ICD-10-PCS | Mod: LT,,, | Performed by: STUDENT IN AN ORGANIZED HEALTH CARE EDUCATION/TRAINING PROGRAM

## 2022-12-07 NOTE — PROGRESS NOTES
Chief Complaint:  Left thumb injury    Consulting Physician: No ref. 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  One of the autistic children sat on her left thumb.  She tried a brace in hopes that this would heal but this did not heal.  She complains of pain at the MP joint worse on the radial side.  It is a achy pain that does not radiate.  There is no numbness or tingling in the fingertips.    Past Medical History:   Diagnosis Date    Anemia     Asthma     Celiac disease     Hashimoto's disease     Migraines        Past Surgical History:   Procedure Laterality Date    ADENOIDECTOMY      MYRINGOTOMY W/ TUBES      TONSILLECTOMY      WISDOM TOOTH EXTRACTION         Current Outpatient Medications   Medication Sig    amitriptyline (ELAVIL) 10 MG tablet Take 10 mg by mouth nightly.    fluconazole (DIFLUCAN) 150 MG Tab Take 1 tablet by mouth once daily at 6am.    LO LOESTRIN FE 1 mg-10 mcg (24)/10 mcg (2) Tab Take 1 tablet by mouth once daily.    NURTEC 75 mg odt Take 75 mg by mouth daily as needed.     No current facility-administered medications for this visit.       Review of patient's allergies indicates:   Allergen Reactions    Imitrex [sumatriptan]      stroke    Penicillin Hives    Latex Itching and Rash       Family History   Problem Relation Age of Onset    Hypertension Father        Social History     Socioeconomic History    Marital status: Single   Tobacco Use    Smoking status: Never    Smokeless tobacco: Never   Substance and Sexual Activity    Alcohol use: Not Currently    Drug use: Never       Review of Systems:    Constitution:   Denies chills, fever, and sweats.  HENT:   Denies headaches or blurry vision.  Cardiovascular:  Denies chest pain or irregular heart beat.  Respiratory:   Denies cough or shortness of breath.  Gastrointestinal:  Denies  abdominal pain, nausea, or vomiting.  Musculoskeletal:   Denies muscle cramps.  Neurological:   Denies dizziness or focal weakness.  Psychiatric/Behavior: Normal mental status.  Hematology/Lymph:  Denies bleeding problem or easy bruising/bleeding.  Skin:    Denies rash or suspicious lesions.    Examination:    Vital Signs:    Vitals:    12/07/22 1436   PainSc:   3       There is no height or weight on file to calculate BMI.    Constitution:   Well-developed, well nourished patient in no acute distress.  Neurological:   Alert and oriented x 3 and cooperative to examination.     Psychiatric/Behavior: Normal mental status.  Respiratory:   No shortness of breath.  Eyes:    Extraoccular muscles intact  Skin:    No scars, rash or suspicious lesions.    MSK:   Left thumb: No open wounds or rashes.  Tenderness to palpation over the radial collateral ligament at the MP joint.  No tenderness to palpation over the ulnar collateral ligament MP joint.  There is laxity to ulnar directed stress at the MP joint.  The thumb is stable with radially directed stress.  She can make a fist and extend her digits.  She is neurovascular intact her hand is warm well perfused    Imaging:   X-ray left hand shows no fracture dislocation     Assessment:  Left radial collateral ligament tear    Plan:  I think she has a tear of the radial collateral ligament of the left thumb.  I will get an MRI of the left thumb to evaluate for this.  I will see her back after this MRI    Follow Up:  After the MRI  Xray at next visit:  None

## 2022-12-14 ENCOUNTER — OFFICE VISIT (OUTPATIENT)
Dept: ORTHOPEDICS | Facility: CLINIC | Age: 22
End: 2022-12-14
Payer: OTHER MISCELLANEOUS

## 2022-12-14 DIAGNOSIS — S63.642A RUPTURE OF RADIAL COLLATERAL LIGAMENT OF LEFT THUMB, INITIAL ENCOUNTER: Primary | ICD-10-CM

## 2022-12-14 PROCEDURE — 99213 OFFICE O/P EST LOW 20 MIN: CPT | Mod: ,,, | Performed by: STUDENT IN AN ORGANIZED HEALTH CARE EDUCATION/TRAINING PROGRAM

## 2022-12-14 PROCEDURE — 99213 PR OFFICE/OUTPT VISIT, EST, LEVL III, 20-29 MIN: ICD-10-PCS | Mod: ,,, | Performed by: STUDENT IN AN ORGANIZED HEALTH CARE EDUCATION/TRAINING PROGRAM

## 2022-12-14 NOTE — LETTER
[facility]  [address] [place] [address]. [address], [address]  Phone: [phone]  Fax: [phone]    Name:Oskar Cao  :2000   Date:2022       [xx] Pending next follow up appointment 02/15/2023  [_] For approximately [_] Days [_] Weeks [_] Months  [_] Pending diagnostic testing.  [_] Pending surgical treatment.  [_] For approximately _ months (Post Surgery)    PATIENT IS UNABLE TO LIFT, PUSH, PULL, THINGS UNTIL NEXT FOLLOW UP APPOINTMENT 02/15/2023.     [XX] SEDENTARY WORK: Lifting 1 pound maximum and occasionally lifting and/or carrying articles such as dockers, ledgers and small tools.  Although a sedentary job is defined as one which involved sitting, a certain amount of walking and standing are required only occasionally and other sedentary criteria are met.    [_] LIGHT WORK: Lifting 20 pounds with frequent lifting and/or carrying objects weighing up to 10 pounds.  Even though the weight lifted may be only a negotiable amount, a job is in the category when it involves sitting most of the time with a degree of pushing/pulling of arm and/or leg controls.    [_] MEDIUM WORK: Lifting of 50 pounds maximum with frequent lifting and/or carrying of objects up to 25 pounds.    [_] HEAVY WORK: Lifting of 100 pounds maximum with frequent lifting and/or carrying objects up to 50 pounds.    [_] VERY HEAVY WORK: Lifting objects in excess of 100 pounds with frequent lifting and/or carrying of objects weighing 50 pounds or more.    [_] REGULAR DUTY: [_] No Restrictions. [_] With Restrictions (See comments below0:    COMMENTS    Jamar Moreau MD

## 2022-12-16 NOTE — PROGRESS NOTES
Chief Complaint:  Left thumb injury    Consulting Physician: No ref. provider found    History of present illness:    Patient is a 21-year-old female who presents for follow-up for left thumb injury.  I saw her in my clinic last time and diagnosed her with a radial collateral ligament injury.  I sent her for an MRI of the left hand she is here with the results.  She is been wearing her Exos splint which has been helping.    Past Medical History:   Diagnosis Date    Anemia     Asthma     Celiac disease     Hashimoto's disease     Migraines        Past Surgical History:   Procedure Laterality Date    ADENOIDECTOMY      MYRINGOTOMY W/ TUBES      TONSILLECTOMY      WISDOM TOOTH EXTRACTION         Current Outpatient Medications   Medication Sig    amitriptyline (ELAVIL) 10 MG tablet Take 10 mg by mouth nightly.    fluconazole (DIFLUCAN) 150 MG Tab Take 1 tablet by mouth once daily at 6am.    LO LOESTRIN FE 1 mg-10 mcg (24)/10 mcg (2) Tab Take 1 tablet by mouth once daily.    NURTEC 75 mg odt Take 75 mg by mouth daily as needed.     No current facility-administered medications for this visit.       Review of patient's allergies indicates:   Allergen Reactions    Imitrex [sumatriptan]      stroke    Penicillin Hives    Latex Itching and Rash       Family History   Problem Relation Age of Onset    Hypertension Father        Social History     Socioeconomic History    Marital status: Single   Tobacco Use    Smoking status: Never    Smokeless tobacco: Never   Substance and Sexual Activity    Alcohol use: Not Currently    Drug use: Never       Review of Systems:    Constitution:   Denies chills, fever, and sweats.  HENT:   Denies headaches or blurry vision.  Cardiovascular:  Denies chest pain or irregular heart beat.  Respiratory:   Denies cough or shortness of breath.  Gastrointestinal:  Denies abdominal pain, nausea, or vomiting.  Musculoskeletal:   Denies muscle cramps.  Neurological:   Denies dizziness or focal  weakness.  Psychiatric/Behavior: Normal mental status.  Hematology/Lymph:  Denies bleeding problem or easy bruising/bleeding.  Skin:    Denies rash or suspicious lesions.    Examination:    Vital Signs:    Vitals:    12/14/22 1424   PainSc:   5       There is no height or weight on file to calculate BMI.    Constitution:   Well-developed, well nourished patient in no acute distress.  Neurological:   Alert and oriented x 3 and cooperative to examination.     Psychiatric/Behavior: Normal mental status.  Respiratory:   No shortness of breath.  Eyes:    Extraoccular muscles intact  Skin:    No scars, rash or suspicious lesions.    MSK:   Left thumb: No open wounds or rashes.  Tenderness to palpation over the radial collateral ligament at the MP joint.  No tenderness to palpation over the ulnar collateral ligament MP joint.  There is laxity to ulnar directed stress at the MP joint.  The thumb is stable with radially directed stress.  She can make a fist and extend her digits.  She is neurovascular intact her hand is warm well perfused    Imaging:   X-ray left hand shows no fracture dislocation    MRI of the left thumb shows radial collateral ligament is intact but it is sprained with some edema superficial to the ligament.     Assessment:  Left radial collateral ligament tear    Plan:  The ligament was partially torn but still in continuity.  We can treat this non operatively.  She can wear the Exos splint at all times.  She can take it off once a week to shower.  I will see her back in 6 weeks see how she is doing    Follow Up:  6 weeks  Xray at next visit:  None

## 2023-01-18 ENCOUNTER — OFFICE VISIT (OUTPATIENT)
Dept: PRIMARY CARE CLINIC | Facility: CLINIC | Age: 23
End: 2023-01-18
Payer: COMMERCIAL

## 2023-01-18 VITALS
WEIGHT: 111.81 LBS | HEIGHT: 62 IN | RESPIRATION RATE: 18 BRPM | SYSTOLIC BLOOD PRESSURE: 122 MMHG | HEART RATE: 88 BPM | BODY MASS INDEX: 20.58 KG/M2 | OXYGEN SATURATION: 99 % | DIASTOLIC BLOOD PRESSURE: 84 MMHG | TEMPERATURE: 99 F

## 2023-01-18 DIAGNOSIS — Z76.89 ENCOUNTER TO ESTABLISH CARE WITH NEW DOCTOR: Primary | ICD-10-CM

## 2023-01-18 DIAGNOSIS — G47.09 OTHER INSOMNIA: ICD-10-CM

## 2023-01-18 DIAGNOSIS — R41.840 DIFFICULTY CONCENTRATING: ICD-10-CM

## 2023-01-18 DIAGNOSIS — Z22.7 TB LUNG, LATENT: ICD-10-CM

## 2023-01-18 DIAGNOSIS — G43.809 OTHER MIGRAINE WITHOUT STATUS MIGRAINOSUS, NOT INTRACTABLE: ICD-10-CM

## 2023-01-18 PROBLEM — G47.00 INSOMNIA: Status: ACTIVE | Noted: 2023-01-18

## 2023-01-18 PROBLEM — G47.00 INSOMNIA: Chronic | Status: ACTIVE | Noted: 2023-01-18

## 2023-01-18 PROBLEM — G43.909 MIGRAINE: Status: ACTIVE | Noted: 2023-01-18

## 2023-01-18 PROBLEM — J06.9 ACUTE URI: Status: RESOLVED | Noted: 2022-09-16 | Resolved: 2023-01-18

## 2023-01-18 PROBLEM — J45.909 ASTHMA: Status: ACTIVE | Noted: 2023-01-18

## 2023-01-18 PROBLEM — D64.9 ANEMIA: Status: ACTIVE | Noted: 2023-01-18

## 2023-01-18 PROCEDURE — 3079F DIAST BP 80-89 MM HG: CPT | Mod: CPTII,,, | Performed by: STUDENT IN AN ORGANIZED HEALTH CARE EDUCATION/TRAINING PROGRAM

## 2023-01-18 PROCEDURE — 1160F PR REVIEW ALL MEDS BY PRESCRIBER/CLIN PHARMACIST DOCUMENTED: ICD-10-PCS | Mod: CPTII,,, | Performed by: STUDENT IN AN ORGANIZED HEALTH CARE EDUCATION/TRAINING PROGRAM

## 2023-01-18 PROCEDURE — 1159F PR MEDICATION LIST DOCUMENTED IN MEDICAL RECORD: ICD-10-PCS | Mod: CPTII,,, | Performed by: STUDENT IN AN ORGANIZED HEALTH CARE EDUCATION/TRAINING PROGRAM

## 2023-01-18 PROCEDURE — 3008F BODY MASS INDEX DOCD: CPT | Mod: CPTII,,, | Performed by: STUDENT IN AN ORGANIZED HEALTH CARE EDUCATION/TRAINING PROGRAM

## 2023-01-18 PROCEDURE — 99203 PR OFFICE/OUTPT VISIT, NEW, LEVL III, 30-44 MIN: ICD-10-PCS | Mod: ,,, | Performed by: STUDENT IN AN ORGANIZED HEALTH CARE EDUCATION/TRAINING PROGRAM

## 2023-01-18 PROCEDURE — 3074F PR MOST RECENT SYSTOLIC BLOOD PRESSURE < 130 MM HG: ICD-10-PCS | Mod: CPTII,,, | Performed by: STUDENT IN AN ORGANIZED HEALTH CARE EDUCATION/TRAINING PROGRAM

## 2023-01-18 PROCEDURE — 3074F SYST BP LT 130 MM HG: CPT | Mod: CPTII,,, | Performed by: STUDENT IN AN ORGANIZED HEALTH CARE EDUCATION/TRAINING PROGRAM

## 2023-01-18 PROCEDURE — 3008F PR BODY MASS INDEX (BMI) DOCUMENTED: ICD-10-PCS | Mod: CPTII,,, | Performed by: STUDENT IN AN ORGANIZED HEALTH CARE EDUCATION/TRAINING PROGRAM

## 2023-01-18 PROCEDURE — 1159F MED LIST DOCD IN RCRD: CPT | Mod: CPTII,,, | Performed by: STUDENT IN AN ORGANIZED HEALTH CARE EDUCATION/TRAINING PROGRAM

## 2023-01-18 PROCEDURE — 3079F PR MOST RECENT DIASTOLIC BLOOD PRESSURE 80-89 MM HG: ICD-10-PCS | Mod: CPTII,,, | Performed by: STUDENT IN AN ORGANIZED HEALTH CARE EDUCATION/TRAINING PROGRAM

## 2023-01-18 PROCEDURE — 99203 OFFICE O/P NEW LOW 30 MIN: CPT | Mod: ,,, | Performed by: STUDENT IN AN ORGANIZED HEALTH CARE EDUCATION/TRAINING PROGRAM

## 2023-01-18 PROCEDURE — 1160F RVW MEDS BY RX/DR IN RCRD: CPT | Mod: CPTII,,, | Performed by: STUDENT IN AN ORGANIZED HEALTH CARE EDUCATION/TRAINING PROGRAM

## 2023-01-18 RX ORDER — LANOLIN ALCOHOL/MO/W.PET/CERES
1 CREAM (GRAM) TOPICAL
COMMUNITY

## 2023-01-18 RX ORDER — HYDROCORTISONE 25 MG/G
CREAM TOPICAL
COMMUNITY
Start: 2022-12-13 | End: 2023-01-18

## 2023-01-18 NOTE — PROGRESS NOTES
Subjective:       Patient ID: Oskar Cao is a 22 y.o. female.    Past Medical History:   ADHD  Anemia  Asthma  Hashimoto's disease  Migraines     Chief Complaint: Establish Care    Presents to the clinic to establish care. Close follow up OBGYN, been serving as her PCP as well. On Atogepant for migraines, well controlled at this time. Noted to have thyroid issues, started on selenium, being monitored closely. Has upcoming labs, if not improved will start levothyroxine per patient. Diagnosed with ADHD at 13 years ago, never had therapy. However, now endorsing lack of concentration and wants to start therapy/re-evaluated. Would like psych referral. Also concerned/has questions about latent TB. States that she never had symptoms, or been treated before. Recently found out that her Quantiferon Gold was positive. CXR was negative per patient. Close follow up with GI as well.     Review of Systems   Constitutional:  Negative for chills, fatigue and fever.   Respiratory:  Negative for cough, shortness of breath and wheezing.    Cardiovascular:  Negative for chest pain, palpitations and leg swelling.   Gastrointestinal:  Negative for abdominal pain, diarrhea, nausea and vomiting.   Genitourinary:  Negative for dysuria and hematuria.   Neurological:  Negative for dizziness, syncope and headaches.   Psychiatric/Behavioral:  Positive for decreased concentration. Negative for sleep disturbance.        Objective:      Physical Exam  Vitals and nursing note reviewed.   Constitutional:       General: She is not in acute distress.     Appearance: Normal appearance. She is not ill-appearing.   Eyes:      General: No scleral icterus.     Extraocular Movements: Extraocular movements intact.      Conjunctiva/sclera: Conjunctivae normal.      Pupils: Pupils are equal, round, and reactive to light.   Cardiovascular:      Rate and Rhythm: Normal rate and regular rhythm.      Pulses: Normal pulses.      Heart sounds: Normal heart  sounds. No murmur heard.  Pulmonary:      Effort: Pulmonary effort is normal. No respiratory distress.      Breath sounds: Normal breath sounds. No wheezing.   Abdominal:      General: There is no distension.      Palpations: Abdomen is soft.      Tenderness: There is no abdominal tenderness.   Musculoskeletal:      Right lower leg: No edema.      Left lower leg: No edema.   Skin:     General: Skin is warm.      Coloration: Skin is not jaundiced.   Neurological:      Mental Status: She is alert. Mental status is at baseline.      Gait: Gait normal.   Psychiatric:         Mood and Affect: Mood normal.         Behavior: Behavior normal.       Assessment & Plan:   1. Encounter to establish care with new doctor  Comments:  Reviewed medical history and reconciled medications   Requested records from previous providers/specialists     2. Other migraine without status migrainosus, not intractable  Assessment & Plan:  Stable   Continue Atogepant   If worsens consider neurology referral     3. Other insomnia  Assessment & Plan:  Stable   Continue Amitriptyline nightly as needed for insomnia  Encouraged good sleep hygiene (limit caffeine to earlier in shift, avoid naps, blackout curtains, blue lights, limit phone/tv exposure etc)     4. TB lung, latent  Assessment & Plan:  Quantiferon Gold Positive, CXR Negative   Still will need therapy to treat latent infection, not contagious at this time, low threshold to repeat CXR if becomes symptomatic prior to therapy   Keep upcoming appointment with Infectious Disease     5. Difficulty concentrating  Comments:  Wants to start ADHD therapy  Orders:  -     Ambulatory referral/consult to Psychiatry; Future; Expected date: 01/25/2023    Follow up in about 6 months (around 7/18/2023) for Wellness. In addition to their scheduled follow up, the patient has also been instructed to follow up on as needed basis.

## 2023-01-18 NOTE — LETTER
AUTHORIZATION FOR RELEASE OF   CONFIDENTIAL INFORMATION    Dear Dr yuan,    We are seeing Oskar Cao, date of birth 2000, in the clinic at [facility]. Jade Jaramillo MD is the patient's PCP. Oskar Cao has an outstanding lab/procedure at the time we reviewed her chart. In order to help keep her health information updated, she has authorized us to request the following medical record(s):        (  )  MAMMOGRAM                                      (  )  COLONOSCOPY      (  )  PAP SMEAR                                          (  )  OUTSIDE LAB RESULTS     (  )  DEXA SCAN                                          (  )  EYE EXAM            (  )  FOOT EXAM                                          (  )  ENTIRE RECORD     (  )  OUTSIDE IMMUNIZATIONS                 (  )  _______________         Please fax records to Jade Jaramillo MD, [phone]     If you have any questions, please contact  at [phone].           Patient Name: Oskar Cao  : 2000  Patient Phone #: [phone]

## 2023-01-23 ENCOUNTER — TELEPHONE (OUTPATIENT)
Dept: ORTHOPEDICS | Facility: CLINIC | Age: 23
End: 2023-01-23
Payer: COMMERCIAL

## 2023-01-23 NOTE — TELEPHONE ENCOUNTER
Patient called and left message stating that she had questions regarding her thumb.   I called patient back who states that she still is not making much improvement. Patient states that she is now able to slightly move her thumb however that is the only improvement. Patient is concerned that she may need surgery.   Patient sees a doctor in April to begin treating TB. Patient would like to know if Dr. Moreau is considering surgery sooner rather that later because she is concerned it will interfere with the TB treatment.

## 2023-01-23 NOTE — TELEPHONE ENCOUNTER
I explained to patient that I would forward the message to Dr. Moreau and give her a call back when he messages me back. Patient voiced a clear understanding.

## 2023-01-29 PROBLEM — G43.909 MIGRAINE: Chronic | Status: ACTIVE | Noted: 2023-01-18

## 2023-01-29 PROBLEM — Z22.7 TB LUNG, LATENT: Chronic | Status: ACTIVE | Noted: 2023-01-18

## 2023-01-30 ENCOUNTER — OFFICE VISIT (OUTPATIENT)
Dept: ORTHOPEDICS | Facility: CLINIC | Age: 23
End: 2023-01-30
Payer: OTHER MISCELLANEOUS

## 2023-01-30 VITALS — WEIGHT: 111 LBS | HEIGHT: 62 IN | BODY MASS INDEX: 20.43 KG/M2

## 2023-01-30 DIAGNOSIS — S63.642A RUPTURE OF RADIAL COLLATERAL LIGAMENT OF LEFT THUMB, INITIAL ENCOUNTER: Primary | ICD-10-CM

## 2023-01-30 PROCEDURE — 99213 PR OFFICE/OUTPT VISIT, EST, LEVL III, 20-29 MIN: ICD-10-PCS | Mod: ,,, | Performed by: STUDENT IN AN ORGANIZED HEALTH CARE EDUCATION/TRAINING PROGRAM

## 2023-01-30 PROCEDURE — 99213 OFFICE O/P EST LOW 20 MIN: CPT | Mod: ,,, | Performed by: STUDENT IN AN ORGANIZED HEALTH CARE EDUCATION/TRAINING PROGRAM

## 2023-01-30 NOTE — PROGRESS NOTES
Chief Complaint:  Left thumb injury    Consulting Physician: No ref. provider found    History of present illness:    Patient is a 21-year-old female who presents for follow-up for left thumb injury.  She has a left thumb radial collateral ligament injury which I am treating non operatively in an Exos splint.  She keeps the splint on at all times except for showering.  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  She feels like her pain is persisting and that she is not improving as quickly as she would like to.  She has a wedding coming up and would like to plan accordingly if she needs surgery and so she would like to discuss the options.      Past Medical History:   Diagnosis Date    ADHD     never on medication    Anemia     Asthma     Hashimoto's disease     Migraines        Past Surgical History:   Procedure Laterality Date    ADENOIDECTOMY      MYRINGOTOMY W/ TUBES      TONSILLECTOMY      WISDOM TOOTH EXTRACTION         Current Outpatient Medications   Medication Sig    amitriptyline (ELAVIL) 10 MG tablet Take 10 mg by mouth nightly.    ferrous sulfate (FEOSOL) Tab tablet Take 1 tablet by mouth daily with breakfast.    fluconazole (DIFLUCAN) 150 MG Tab Take 1 tablet by mouth once daily at 6am. As needede    LO LOESTRIN FE 1 mg-10 mcg (24)/10 mcg (2) Tab Take 1 tablet by mouth once daily.     No current facility-administered medications for this visit.       Review of patient's allergies indicates:   Allergen Reactions    Imitrex [sumatriptan]      stroke    Penicillin Hives    Latex Itching and Rash       Family History   Problem Relation Age of Onset    Mental illness Father     Hyperlipidemia Father     Hypertension Father        Social History     Socioeconomic History    Marital status: Single   Occupational History    Occupation: Behavioral  therapy   Tobacco Use    Smoking status: Never    Smokeless tobacco:  "Never   Substance and Sexual Activity    Alcohol use: Not Currently    Drug use: Never    Sexual activity: Yes       Review of Systems:    Constitution:   Denies chills, fever, and sweats.  HENT:   Denies headaches or blurry vision.  Cardiovascular:  Denies chest pain or irregular heart beat.  Respiratory:   Denies cough or shortness of breath.  Gastrointestinal:  Denies abdominal pain, nausea, or vomiting.  Musculoskeletal:   Denies muscle cramps.  Neurological:   Denies dizziness or focal weakness.  Psychiatric/Behavior: Normal mental status.  Hematology/Lymph:  Denies bleeding problem or easy bruising/bleeding.  Skin:    Denies rash or suspicious lesions.    Examination:    Vital Signs:    Vitals:    01/30/23 1533   Weight: 50.3 kg (111 lb)   Height: 5' 2" (1.575 m)   PainSc:   7       Body mass index is 20.3 kg/m².    Constitution:   Well-developed, well nourished patient in no acute distress.  Neurological:   Alert and oriented x 3 and cooperative to examination.     Psychiatric/Behavior: Normal mental status.  Respiratory:   No shortness of breath.  Eyes:    Extraoccular muscles intact  Skin:    No scars, rash or suspicious lesions.    MSK:   Left thumb: No open wounds or rashes.  Tenderness to palpation over the radial collateral ligament at the MP joint.  No tenderness to palpation over the ulnar collateral ligament MP joint.  There is slight laxity to ulnar directed stress at the MP joint.  The thumb is stable with radially directed stress.  She can make a fist and extend her digits.  She is neurovascular intact her hand is warm well perfused    Imaging:   X-ray left hand shows no fracture dislocation    MRI of the left thumb shows radial collateral ligament is intact but it is sprained with some edema superficial to the ligament.     Assessment:  Left radial collateral ligament tear    Plan:  I again reviewed the MRI with her and explained that I think this should heal without surgery with immobilization.  " I recommend 4 more weeks of immobilization in the Exos cast and I will see her back in 4 weeks.  She should leave this cast on at all times except for hygiene.  If she still does not have signs of improvement over the next 4 weeks then we will have to discuss surgery next time she comes in    Follow Up:  4 weeks  Xray at next visit:  None

## 2023-01-30 NOTE — ASSESSMENT & PLAN NOTE
Quantiferon Gold Positive, CXR Negative   Still will need therapy to treat latent infection, not contagious at this time, low threshold to repeat CXR if becomes symptomatic prior to therapy   Keep upcoming appointment with Infectious Disease

## 2023-01-30 NOTE — ASSESSMENT & PLAN NOTE
Stable   Continue Amitriptyline nightly as needed for insomnia  Encouraged good sleep hygiene (limit caffeine to earlier in shift, avoid naps, blackout curtains, blue lights, limit phone/tv exposure etc)

## 2023-02-20 ENCOUNTER — OFFICE VISIT (OUTPATIENT)
Dept: URGENT CARE | Facility: CLINIC | Age: 23
End: 2023-02-20
Payer: COMMERCIAL

## 2023-02-20 VITALS
DIASTOLIC BLOOD PRESSURE: 89 MMHG | SYSTOLIC BLOOD PRESSURE: 120 MMHG | TEMPERATURE: 99 F | HEART RATE: 88 BPM | HEIGHT: 62 IN | RESPIRATION RATE: 18 BRPM | BODY MASS INDEX: 20.43 KG/M2 | WEIGHT: 111 LBS | OXYGEN SATURATION: 98 %

## 2023-02-20 DIAGNOSIS — L55.9 SUNBURN: Primary | ICD-10-CM

## 2023-02-20 PROCEDURE — 3074F SYST BP LT 130 MM HG: CPT | Mod: CPTII,,, | Performed by: FAMILY MEDICINE

## 2023-02-20 PROCEDURE — 99213 PR OFFICE/OUTPT VISIT, EST, LEVL III, 20-29 MIN: ICD-10-PCS | Mod: 25,,, | Performed by: FAMILY MEDICINE

## 2023-02-20 PROCEDURE — 1160F PR REVIEW ALL MEDS BY PRESCRIBER/CLIN PHARMACIST DOCUMENTED: ICD-10-PCS | Mod: CPTII,,, | Performed by: FAMILY MEDICINE

## 2023-02-20 PROCEDURE — 96372 PR INJECTION,THERAP/PROPH/DIAG2ST, IM OR SUBCUT: ICD-10-PCS | Mod: ,,, | Performed by: FAMILY MEDICINE

## 2023-02-20 PROCEDURE — 1160F RVW MEDS BY RX/DR IN RCRD: CPT | Mod: CPTII,,, | Performed by: FAMILY MEDICINE

## 2023-02-20 PROCEDURE — 99213 OFFICE O/P EST LOW 20 MIN: CPT | Mod: 25,,, | Performed by: FAMILY MEDICINE

## 2023-02-20 PROCEDURE — 1159F PR MEDICATION LIST DOCUMENTED IN MEDICAL RECORD: ICD-10-PCS | Mod: CPTII,,, | Performed by: FAMILY MEDICINE

## 2023-02-20 PROCEDURE — 96372 THER/PROPH/DIAG INJ SC/IM: CPT | Mod: ,,, | Performed by: FAMILY MEDICINE

## 2023-02-20 PROCEDURE — 3008F PR BODY MASS INDEX (BMI) DOCUMENTED: ICD-10-PCS | Mod: CPTII,,, | Performed by: FAMILY MEDICINE

## 2023-02-20 PROCEDURE — 1159F MED LIST DOCD IN RCRD: CPT | Mod: CPTII,,, | Performed by: FAMILY MEDICINE

## 2023-02-20 PROCEDURE — 3079F PR MOST RECENT DIASTOLIC BLOOD PRESSURE 80-89 MM HG: ICD-10-PCS | Mod: CPTII,,, | Performed by: FAMILY MEDICINE

## 2023-02-20 PROCEDURE — 3008F BODY MASS INDEX DOCD: CPT | Mod: CPTII,,, | Performed by: FAMILY MEDICINE

## 2023-02-20 PROCEDURE — 3074F PR MOST RECENT SYSTOLIC BLOOD PRESSURE < 130 MM HG: ICD-10-PCS | Mod: CPTII,,, | Performed by: FAMILY MEDICINE

## 2023-02-20 PROCEDURE — 3079F DIAST BP 80-89 MM HG: CPT | Mod: CPTII,,, | Performed by: FAMILY MEDICINE

## 2023-02-20 RX ORDER — PREDNISONE 10 MG/1
30 TABLET ORAL DAILY
Qty: 15 TABLET | Refills: 0 | Status: SHIPPED | OUTPATIENT
Start: 2023-02-20 | End: 2023-02-20

## 2023-02-20 RX ORDER — DEXAMETHASONE SODIUM PHOSPHATE 100 MG/10ML
10 INJECTION INTRAMUSCULAR; INTRAVENOUS
Status: COMPLETED | OUTPATIENT
Start: 2023-02-20 | End: 2023-02-20

## 2023-02-20 RX ORDER — PREDNISONE 10 MG/1
30 TABLET ORAL DAILY
Qty: 15 TABLET | Refills: 0 | Status: SHIPPED | OUTPATIENT
Start: 2023-02-20 | End: 2023-02-25

## 2023-02-20 RX ADMIN — DEXAMETHASONE SODIUM PHOSPHATE 10 MG: 100 INJECTION INTRAMUSCULAR; INTRAVENOUS at 10:02

## 2023-02-20 NOTE — PROGRESS NOTES
"Subjective:       Patient ID: Oskar Cao is a 22 y.o. female.    Vitals:  height is 5' 2" (1.575 m) and weight is 50.3 kg (111 lb). Her oral temperature is 98.6 °F (37 °C). Her blood pressure is 120/89 and her pulse is 88. Her respiration is 18 and oxygen saturation is 98%.     Chief Complaint: Eye Problem (Onset yesterday, pt stated that both eyes are swollen )    22-year-old female presents to clinic complaining of a sunburn to the face with swelling.  Was out all day in the sun 2 days ago for morning or operate.  Has been applying Aveeno moisturizer, aloe vera, aloe cane, oatmeal baths.  Ibuprofen 600 as well.  Denies any open wounds or weeping      Constitution: Negative.   HENT: Negative.     Cardiovascular: Negative.    Eyes: Negative.    Respiratory: Negative.     Gastrointestinal: Negative.    Genitourinary: Negative.    Musculoskeletal: Negative.    Skin:  Positive for rash and erythema (mild erythema swelling of the face and there are no blisters or open wounds).   Allergic/Immunologic: Negative.    Neurological: Negative.    Hematologic/Lymphatic: Negative.      Objective:      Physical Exam   Constitutional: She is oriented to person, place, and time.  Non-toxic appearance. She does not appear ill. No distress.   HENT:   Head: Normocephalic and atraumatic.   Eyes: Conjunctivae are normal. Pupils are equal, round, and reactive to light. Extraocular movement intact   Abdominal: Normal appearance.   Neurological: She is alert and oriented to person, place, and time.   Skin: Skin is not diaphoretic and rash. erythema (mild erythema swelling of the face and there are no blisters or open wounds)   Psychiatric: Her behavior is normal. Mood, judgment and thought content normal.   Vitals reviewed.         Previous History      Review of patient's allergies indicates:   Allergen Reactions    Imitrex [sumatriptan]      stroke    Penicillin Hives    Latex Itching and Rash       Past Medical History:   Diagnosis " "Date    ADHD     never on medication    Anemia     Asthma     Hashimoto's disease     Migraines      Current Outpatient Medications   Medication Instructions    amitriptyline (ELAVIL) 10 mg, Oral, Nightly    ferrous sulfate (FEOSOL) Tab tablet 1 tablet, Oral, With breakfast    fluconazole (DIFLUCAN) 150 MG Tab 1 tablet, Oral, Daily, As needede    LO LOESTRIN FE 1 mg-10 mcg (24)/10 mcg (2) Tab 1 tablet, Oral, Daily    predniSONE (DELTASONE) 30 mg, Oral, Daily     Past Surgical History:   Procedure Laterality Date    ADENOIDECTOMY      MYRINGOTOMY W/ TUBES      TONSILLECTOMY      WISDOM TOOTH EXTRACTION       Family History   Problem Relation Age of Onset    Mental illness Father     Hyperlipidemia Father     Hypertension Father        Social History     Tobacco Use    Smoking status: Never    Smokeless tobacco: Never   Substance Use Topics    Alcohol use: Not Currently    Drug use: Never        Physical Exam      Vital Signs Reviewed   /89   Pulse 88   Temp 98.6 °F (37 °C) (Oral)   Resp 18   Ht 5' 2" (1.575 m)   Wt 50.3 kg (111 lb)   SpO2 98%   BMI 20.30 kg/m²        Procedures    Procedures     Labs     Results for orders placed or performed in visit on 11/28/22   Quantiferon Gold TB   Result Value Ref Range    QuantiFERON-Tb Gold Plus Result Positive (A) Negative    TB1 Ag minus Nil Result 1.27 IU/mL    TB2 Ag minus Nil Result 1.32 IU/mL    Mitogen minus Nil Result 9.84 IU/mL    Nil Result 0.16 IU/mL   IgA   Result Value Ref Range    IgA Level 180.0 65.0 - 421.0 mg/dL   Endomysial antibody, IgA titer   Result Value Ref Range    Endomysial Ab Negative Negative   GLIADIN PEPTIDE IGG IGA ANTIBODIES   Result Value Ref Range    GLIADIN IGA DEAMINATED ([facility]) Negative Negative unit/mL    GLIADIN IGG DEAMINATED ([facility]) Negative Negative   TISSUE TRANSGLUTAMINASE IGA ANTIBODY   Result Value Ref Range    Bev Celandreeay IgA (tTG IgA) Negative Negative       Assessment:       1. Sunburn          Plan:       Medications " sent to pharmacy  Start the steroids tomorrow morning  You can continue aloe vera, aloe cane, Aveeno moisturizer to the affected areas  Cold compress to the affected areas 3 to 4 times a day for 10-15 minutes  Ibuprofen 600 mg every 6 hours as needed.  Take with food.  Drink plenty of fluids  Stay out of direct sunlight.  Apply sunscreen if needed  If symptoms persist or worsen or there open wounds return to clinic or seek medical attention immediately  Sunburn    Other orders  -     dexAMETHasone injection 10 mg  -     predniSONE (DELTASONE) 10 MG tablet; Take 3 tablets (30 mg total) by mouth once daily. for 5 days  Dispense: 15 tablet; Refill: 0

## 2023-02-20 NOTE — PATIENT INSTRUCTIONS
Medications sent to pharmacy  Start the steroids tomorrow morning  You can continue aloe vera, aloe cane, Aveeno moisturizer to the affected areas  Cold compress to the affected areas 3 to 4 times a day for 10-15 minutes  Ibuprofen 600 mg every 6 hours as needed.  Take with food.  Drink plenty of fluids  Stay out of direct sunlight.  Apply sunscreen if needed  If symptoms persist or worsen or there open wounds return to clinic or seek medical attention immediately

## 2023-03-01 ENCOUNTER — OFFICE VISIT (OUTPATIENT)
Dept: ORTHOPEDICS | Facility: CLINIC | Age: 23
End: 2023-03-01
Payer: OTHER MISCELLANEOUS

## 2023-03-01 VITALS
DIASTOLIC BLOOD PRESSURE: 68 MMHG | HEART RATE: 72 BPM | SYSTOLIC BLOOD PRESSURE: 117 MMHG | WEIGHT: 110.88 LBS | BODY MASS INDEX: 20.4 KG/M2 | RESPIRATION RATE: 18 BRPM | TEMPERATURE: 98 F | HEIGHT: 62 IN

## 2023-03-01 DIAGNOSIS — S63.642A RUPTURE OF RADIAL COLLATERAL LIGAMENT OF LEFT THUMB, INITIAL ENCOUNTER: Primary | ICD-10-CM

## 2023-03-01 PROCEDURE — 99213 OFFICE O/P EST LOW 20 MIN: CPT | Mod: ,,, | Performed by: STUDENT IN AN ORGANIZED HEALTH CARE EDUCATION/TRAINING PROGRAM

## 2023-03-01 PROCEDURE — 99213 PR OFFICE/OUTPT VISIT, EST, LEVL III, 20-29 MIN: ICD-10-PCS | Mod: ,,, | Performed by: STUDENT IN AN ORGANIZED HEALTH CARE EDUCATION/TRAINING PROGRAM

## 2023-03-04 NOTE — PROGRESS NOTES
Chief Complaint:  Left thumb injury    Consulting Physician: No ref. provider found    History of present illness:    Patient is a 21-year-old female who presents for follow-up for left thumb injury.  She has a left thumb radial collateral ligament injury which I am treating non operatively in an Exos splint.  She is compliant with splint wear.  She states that her thumb pain has been improving although there is still some pain present.  Overall she is improving    Past Medical History:   Diagnosis Date    ADHD     never on medication    Anemia     Asthma     Hashimoto's disease     Migraines        Past Surgical History:   Procedure Laterality Date    ADENOIDECTOMY      MYRINGOTOMY W/ TUBES      TONSILLECTOMY      WISDOM TOOTH EXTRACTION         Current Outpatient Medications   Medication Sig    amitriptyline (ELAVIL) 10 MG tablet Take 10 mg by mouth nightly.    ferrous sulfate (FEOSOL) Tab tablet Take 1 tablet by mouth daily with breakfast.    fluconazole (DIFLUCAN) 150 MG Tab Take 1 tablet by mouth once daily at 6am. As needede    LO LOESTRIN FE 1 mg-10 mcg (24)/10 mcg (2) Tab Take 1 tablet by mouth once daily.     No current facility-administered medications for this visit.       Review of patient's allergies indicates:   Allergen Reactions    Imitrex [sumatriptan]      stroke    Penicillin Hives    Latex Itching and Rash       Family History   Problem Relation Age of Onset    Mental illness Father     Hyperlipidemia Father     Hypertension Father        Social History     Socioeconomic History    Marital status: Single   Occupational History    Occupation: Behavioral  therapy   Tobacco Use    Smoking status: Never    Smokeless tobacco: Never   Substance and Sexual Activity    Alcohol use: Not Currently    Drug use: Never    Sexual activity: Yes       Review of Systems:    Constitution:   Denies chills, fever, and sweats.  HENT:   Denies headaches or blurry vision.  Cardiovascular:  Denies chest pain or  "irregular heart beat.  Respiratory:   Denies cough or shortness of breath.  Gastrointestinal:  Denies abdominal pain, nausea, or vomiting.  Musculoskeletal:   Denies muscle cramps.  Neurological:   Denies dizziness or focal weakness.  Psychiatric/Behavior: Normal mental status.  Hematology/Lymph:  Denies bleeding problem or easy bruising/bleeding.  Skin:    Denies rash or suspicious lesions.    Examination:    Vital Signs:    Vitals:    03/01/23 1535   BP: 117/68   Pulse: 72   Resp: 18   Temp: 97.7 °F (36.5 °C)   TempSrc: Oral   Weight: 50.3 kg (110 lb 14.3 oz)   Height: 5' 2" (1.575 m)       Body mass index is 20.28 kg/m².    Constitution:   Well-developed, well nourished patient in no acute distress.  Neurological:   Alert and oriented x 3 and cooperative to examination.     Psychiatric/Behavior: Normal mental status.  Respiratory:   No shortness of breath.  Eyes:    Extraoccular muscles intact  Skin:    No scars, rash or suspicious lesions.    MSK:   Left thumb: No open wounds or rashes.  Tenderness to palpation over the radial collateral ligament at the MP joint.  No tenderness to palpation over the ulnar collateral ligament MP joint.  There is slight laxity to ulnar directed stress at the MP joint but there is a endpoint.  The thumb is stable with radially directed stress.  She can make a fist and extend her digits.  She is neurovascular intact her hand is warm well perfused    Imaging:   X-ray left hand shows no fracture dislocation    MRI of the left thumb shows radial collateral ligament is intact but it is sprained with some edema superficial to the ligament.     Assessment:  Left radial collateral ligament tear    Plan:  Since she is improving I think we continue treating this non operatively.  She can continue wearing the splint until she is asymptomatic.  Once her pain resolves she may discontinue the splint and resume activities as tolerated.  I am happy to see her back in 4-6 weeks or as " needed    Follow Up:  4-6 weeks  Xray at next visit:  None

## 2023-03-10 ENCOUNTER — OFFICE VISIT (OUTPATIENT)
Dept: URGENT CARE | Facility: CLINIC | Age: 23
End: 2023-03-10
Payer: COMMERCIAL

## 2023-03-10 VITALS
DIASTOLIC BLOOD PRESSURE: 81 MMHG | HEIGHT: 62 IN | BODY MASS INDEX: 20.24 KG/M2 | RESPIRATION RATE: 18 BRPM | WEIGHT: 110 LBS | HEART RATE: 80 BPM | TEMPERATURE: 99 F | OXYGEN SATURATION: 99 % | SYSTOLIC BLOOD PRESSURE: 116 MMHG

## 2023-03-10 DIAGNOSIS — K52.9 GASTROENTERITIS: ICD-10-CM

## 2023-03-10 DIAGNOSIS — R09.81 NASAL CONGESTION: Primary | ICD-10-CM

## 2023-03-10 LAB
CTP QC/QA: YES
MOLECULAR STREP A: NEGATIVE

## 2023-03-10 PROCEDURE — 99213 OFFICE O/P EST LOW 20 MIN: CPT | Mod: ,,,

## 2023-03-10 PROCEDURE — 99213 PR OFFICE/OUTPT VISIT, EST, LEVL III, 20-29 MIN: ICD-10-PCS | Mod: ,,,

## 2023-03-10 PROCEDURE — 87651 STREP A DNA AMP PROBE: CPT | Mod: QW,,,

## 2023-03-10 PROCEDURE — 87651 POCT STREP A MOLECULAR: ICD-10-PCS | Mod: QW,,,

## 2023-03-10 RX ORDER — DICYCLOMINE HYDROCHLORIDE 10 MG/1
10 CAPSULE ORAL EVERY 8 HOURS PRN
Qty: 9 CAPSULE | Refills: 0 | Status: SHIPPED | OUTPATIENT
Start: 2023-03-10 | End: 2023-03-13

## 2023-03-10 NOTE — PROGRESS NOTES
"Subjective:       Patient ID: Oskar Cao is a 22 y.o. female.    Vitals:  height is 5' 2" (1.575 m) and weight is 49.9 kg (110 lb). Her oral temperature is 98.7 °F (37.1 °C). Her blood pressure is 116/81 and her pulse is 80. Her respiration is 18 and oxygen saturation is 99%.     Chief Complaint: Nausea (Nausea, stomach cramps, nasal congestion, vomiting x 2-3 days/Zofran  8 mg not working)    A 23 y/o female presents to the clinic with c/o nausea, vomiting, nasal congestion, and abdominal cramping x 2 days. 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 She denies any diarrhea, fever, recent travel, concerns for food poisoning, sob, cp, n/v/d, rash, difficulty swallowing, neck stiffness, or changes in output.         Constitution: Negative for fever.   HENT:  Positive for congestion. Negative for sore throat.    Neck: neck negative.   Cardiovascular: Negative.    Eyes: Negative.    Respiratory: Negative.     Gastrointestinal:  Positive for abdominal pain, nausea and vomiting. Negative for diarrhea and bright red blood in stool.   Endocrine: negative.   Genitourinary: Negative.      Objective:      Physical Exam   Constitutional: She is oriented to person, place, and time. She appears well-developed. She is cooperative.  Non-toxic appearance. She does not appear ill. No distress.   HENT:   Head: Normocephalic and atraumatic.   Ears:   Right Ear: Hearing, tympanic membrane and external ear normal.   Left Ear: Hearing, tympanic membrane and external ear normal.   Nose: Congestion present.   Mouth/Throat: Mucous membranes are normal. Mucous membranes are moist. Posterior oropharyngeal erythema present. Oropharynx is clear.   Eyes: Conjunctivae and lids are normal.   Neck: Trachea normal and phonation normal. Neck supple. No edema present. No erythema present. No neck rigidity present.   Cardiovascular: Normal rate.   Pulmonary/Chest: Effort normal and breath sounds normal. No respiratory distress. She has no decreased breath " "sounds. She has no wheezes.   Abdominal: Normal appearance and bowel sounds are normal. Soft. flat abdomen There is generalized abdominal tenderness. There is no rebound, no guarding, no tenderness at McBurney's point and negative Welsh's sign.   Neurological: She is alert and oriented to person, place, and time. She exhibits normal muscle tone. Coordination normal.   Skin: Skin is warm, dry, intact, not diaphoretic, not pale (capillary refill <2 seconds, skin turgor <2 seconds) and no rash. Capillary refill takes less than 2 seconds.   Psychiatric: Her speech is normal and behavior is normal. Mood normal.   Nursing note and vitals reviewed.         Previous History      Review of patient's allergies indicates:   Allergen Reactions    Imitrex [sumatriptan]      stroke    Penicillin Hives    Latex Itching and Rash       Past Medical History:   Diagnosis Date    ADHD     never on medication    Anemia     Asthma     Hashimoto's disease     Migraines      Current Outpatient Medications   Medication Instructions    amitriptyline (ELAVIL) 10 mg, Oral, Nightly    dicyclomine (BENTYL) 10 mg, Oral, Every 8 hours PRN    ferrous sulfate (FEOSOL) Tab tablet 1 tablet, Oral, With breakfast    fluconazole (DIFLUCAN) 150 MG Tab 1 tablet, Oral, Daily, As needede    LO LOESTRIN FE 1 mg-10 mcg (24)/10 mcg (2) Tab 1 tablet, Oral, Daily     Past Surgical History:   Procedure Laterality Date    ADENOIDECTOMY      MYRINGOTOMY W/ TUBES      TONSILLECTOMY      WISDOM TOOTH EXTRACTION       Family History   Problem Relation Age of Onset    Mental illness Father     Hyperlipidemia Father     Hypertension Father        Social History     Tobacco Use    Smoking status: Never    Smokeless tobacco: Never   Substance Use Topics    Alcohol use: Not Currently    Drug use: Never        Physical Exam      Vital Signs Reviewed   /81   Pulse 80   Temp 98.7 °F (37.1 °C) (Oral)   Resp 18   Ht 5' 2" (1.575 m)   Wt 49.9 kg (110 lb)   LMP  (LMP " Unknown)   SpO2 99%   BMI 20.12 kg/m²        Procedures    Procedures     Labs     Results for orders placed or performed in visit on 11/28/22   Quantiferon Gold TB   Result Value Ref Range    QuantiFERON-Tb Gold Plus Result Positive (A) Negative    TB1 Ag minus Nil Result 1.27 IU/mL    TB2 Ag minus Nil Result 1.32 IU/mL    Mitogen minus Nil Result 9.84 IU/mL    Nil Result 0.16 IU/mL   IgA   Result Value Ref Range    IgA Level 180.0 65.0 - 421.0 mg/dL   Endomysial antibody, IgA titer   Result Value Ref Range    Endomysial Ab Negative Negative   GLIADIN PEPTIDE IGG IGA ANTIBODIES   Result Value Ref Range    GLIADIN IGA DEAMINATED ([facility]) Negative Negative unit/mL    GLIADIN IGG DEAMINATED ([facility]) Negative Negative   TISSUE TRANSGLUTAMINASE IGA ANTIBODY   Result Value Ref Range    Bev Celikey IgA (tTG IgA) Negative Negative       Assessment:       1. Nasal congestion    2. Gastroenteritis            Plan:         Nasal congestion  -     POCT Strep A, Molecular    Gastroenteritis    Other orders  -     dicyclomine (BENTYL) 10 MG capsule; Take 1 capsule (10 mg total) by mouth every 8 (eight) hours as needed (abdominal cramping).  Dispense: 9 capsule; Refill: 0    Strep negative     Increase fluid intake and monitor for signs of dehydration including dark colored urine, weakness, lethargy, dizziness, etc.   Get plenty of rest.   BRAT Diet: Begin eating a BRAT diet as tolerated (bananas, plain rice, apple sauce, plain toast).  Fever / Body Aches: Take OTC Tylenol or Motrin per package instructions as needed.   Nausea: take zofran as needed for nausea and bentyl as needed for stomach cramps   Diarrhea: Take OTC Imodium per package instructions as needed for non-bloody diarrhea.   Return to the clinic or follow-up with your Primary Care Provider as needed.   Present to the nearest Emergency Department with any significant change or worsening symptoms.

## 2023-03-10 NOTE — PATIENT INSTRUCTIONS
Strep negative     Increase fluid intake and monitor for signs of dehydration including dark colored urine, weakness, lethargy, dizziness, etc.   Get plenty of rest.   BRAT Diet: Begin eating a BRAT diet as tolerated (bananas, plain rice, apple sauce, plain toast).  Fever / Body Aches: Take OTC Tylenol or Motrin per package instructions as needed.   Nausea: take zofran as needed for nausea and bentyl as needed for stomach cramps   Diarrhea: Take OTC Imodium per package instructions as needed for non-bloody diarrhea.   Return to the clinic or follow-up with your Primary Care Provider as needed.   Present to the nearest Emergency Department with any significant change or worsening symptoms.

## 2023-04-05 ENCOUNTER — OFFICE VISIT (OUTPATIENT)
Dept: INFECTIOUS DISEASES | Facility: CLINIC | Age: 23
End: 2023-04-05
Payer: COMMERCIAL

## 2023-04-05 VITALS
RESPIRATION RATE: 18 BRPM | BODY MASS INDEX: 20.7 KG/M2 | DIASTOLIC BLOOD PRESSURE: 82 MMHG | WEIGHT: 112.5 LBS | HEART RATE: 87 BPM | SYSTOLIC BLOOD PRESSURE: 118 MMHG | HEIGHT: 62 IN | OXYGEN SATURATION: 99 %

## 2023-04-05 DIAGNOSIS — J45.909 ASTHMA, UNSPECIFIED ASTHMA SEVERITY, UNSPECIFIED WHETHER COMPLICATED, UNSPECIFIED WHETHER PERSISTENT: ICD-10-CM

## 2023-04-05 DIAGNOSIS — R04.2 HEMOPTYSIS: ICD-10-CM

## 2023-04-05 DIAGNOSIS — E06.3 HASHIMOTO'S DISEASE: ICD-10-CM

## 2023-04-05 DIAGNOSIS — Z22.7 TB LUNG, LATENT: Primary | Chronic | ICD-10-CM

## 2023-04-05 DIAGNOSIS — Z11.3 ROUTINE SCREENING FOR STI (SEXUALLY TRANSMITTED INFECTION): ICD-10-CM

## 2023-04-05 PROCEDURE — 99999 PR PBB SHADOW E&M-EST. PATIENT-LVL IV: CPT | Mod: PBBFAC,,, | Performed by: GENERAL PRACTICE

## 2023-04-05 PROCEDURE — 99999 PR PBB SHADOW E&M-EST. PATIENT-LVL IV: ICD-10-PCS | Mod: PBBFAC,,, | Performed by: GENERAL PRACTICE

## 2023-04-05 PROCEDURE — 3008F BODY MASS INDEX DOCD: CPT | Mod: CPTII,S$GLB,, | Performed by: GENERAL PRACTICE

## 2023-04-05 PROCEDURE — 3074F PR MOST RECENT SYSTOLIC BLOOD PRESSURE < 130 MM HG: ICD-10-PCS | Mod: CPTII,S$GLB,, | Performed by: GENERAL PRACTICE

## 2023-04-05 PROCEDURE — 99205 OFFICE O/P NEW HI 60 MIN: CPT | Mod: S$GLB,,, | Performed by: GENERAL PRACTICE

## 2023-04-05 PROCEDURE — 1159F MED LIST DOCD IN RCRD: CPT | Mod: CPTII,S$GLB,, | Performed by: GENERAL PRACTICE

## 2023-04-05 PROCEDURE — 3079F DIAST BP 80-89 MM HG: CPT | Mod: CPTII,S$GLB,, | Performed by: GENERAL PRACTICE

## 2023-04-05 PROCEDURE — 1159F PR MEDICATION LIST DOCUMENTED IN MEDICAL RECORD: ICD-10-PCS | Mod: CPTII,S$GLB,, | Performed by: GENERAL PRACTICE

## 2023-04-05 PROCEDURE — 3008F PR BODY MASS INDEX (BMI) DOCUMENTED: ICD-10-PCS | Mod: CPTII,S$GLB,, | Performed by: GENERAL PRACTICE

## 2023-04-05 PROCEDURE — 99205 PR OFFICE/OUTPT VISIT, NEW, LEVL V, 60-74 MIN: ICD-10-PCS | Mod: S$GLB,,, | Performed by: GENERAL PRACTICE

## 2023-04-05 PROCEDURE — 3074F SYST BP LT 130 MM HG: CPT | Mod: CPTII,S$GLB,, | Performed by: GENERAL PRACTICE

## 2023-04-05 PROCEDURE — 3079F PR MOST RECENT DIASTOLIC BLOOD PRESSURE 80-89 MM HG: ICD-10-PCS | Mod: CPTII,S$GLB,, | Performed by: GENERAL PRACTICE

## 2023-04-05 RX ORDER — SELENIUM 50 MCG
50 TABLET ORAL DAILY
COMMUNITY

## 2023-04-05 NOTE — PROGRESS NOTES
Subjective:       Patient ID: Oskar Cao 22 y.o.     Chief Complaint: No chief complaint on file.       HPI:  04/05/2023:  22-year-old female patient known to have past medical history significant for migraines, Hashimoto's disease, asthma, somnambulism presents for establishing care and evaluation of positive QuantiFERON gold test.    She currently denies any symptoms, no fevers, no chills, no night sweats, no weight loss, no cough, no shortness of breath.  She does however report that for a period of time, reportedly 6 weeks, in 2020 where she was having significant illness with severe cough and hemoptysis, was tested for COVID and multiple other infections but was found to be negative, unclear if she was tested for tuberculosis at that time.  Since then however she has improved, she does not have any further cough, no further episodes of hemoptysis.      Patient was being evaluated by Gastroenterology for suspicion of celiac disease given recurrent rash on the patient's hands as well as episodes of constipation and diarrhea.  As part of this workup, she had a QuantiFERON test which was positive.    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  As far as she knows does not have any exposure to tuberculosis.  Never worked in direct health care, has not traveled to endemic areas.     Patient is on hormonal birth control as well as migraine prevention medication (Atogepant)    Past Medical History:   Diagnosis Date    ADHD     never on medication    Anemia     Asthma     Hashimoto's disease     Migraines         Past Surgical History:   Procedure Laterality Date    ADENOIDECTOMY      MYRINGOTOMY W/ TUBES      TONSILLECTOMY      WISDOM TOOTH EXTRACTION          Social History     Socioeconomic History    Marital status: Single   Occupational History     "Occupation: Behavioral  therapy   Tobacco Use    Smoking status: Never    Smokeless tobacco: Never   Substance and Sexual Activity    Alcohol use: Not Currently    Drug use: Never    Sexual activity: Yes        Family History   Problem Relation Age of Onset    Mental illness Father     Hyperlipidemia Father     Hypertension Father         Review of patient's allergies indicates:   Allergen Reactions    Imitrex [sumatriptan]      stroke    Adhesive tape-silicones     Aloe vera latex gloves [gloves, latex with aloe vera]     Penicillin Hives    Penicillins     Latex Itching and Rash        Immunization History   Administered Date(s) Administered    DTaP 02/22/2000, 04/24/2000, 06/22/2000, 12/22/2000, 07/26/2004    HPV 9-Valent 02/10/2011, 04/26/2011, 08/26/2011    HPV Quadrivalent 02/10/2011, 04/26/2011, 08/26/2011    Hep B / HiB 02/22/2000, 04/24/2000, 12/22/2000    Hepatitis A, Pediatric/Adolescent, 2 Dose 05/28/2015, 03/01/2016    IPV 02/22/2000, 04/24/2000, 06/22/2000, 07/26/2004    Influenza (Flumist) - Quadrivalent - Intranasal *Preferred* (2-49 years old) 10/21/2014    Influenza - Quadrivalent - PF *Preferred* (6 months and older) 10/05/2011    Influenza - Trivalent - PF (ADULT) 10/05/2011    MMR 12/22/2000, 07/26/2004    Meningococcal B, OMV 05/26/2017, 11/30/2017    Meningococcal B, recombinant 05/26/2017, 11/30/2017    Meningococcal Conjugate (MCV4P) 02/10/2011, 03/01/2016    Pneumococcal Conjugate - 7 Valent 06/12/2001    Tdap 02/10/2011    Varicella 12/22/2000, 04/28/2008        Review of Systems   All other systems reviewed and are negative.       Objective:      /82 (BP Location: Right arm)   Pulse 87   Resp 18   Ht 5' 2" (1.575 m)   Wt 51 kg (112 lb 8 oz)   LMP  (LMP Unknown)   SpO2 99%   BMI 20.58 kg/m²      Physical Exam  Constitutional:       Appearance: Normal appearance.   HENT:      Head: Normocephalic and atraumatic.      Mouth/Throat:      Pharynx: No oropharyngeal exudate or " posterior oropharyngeal erythema.   Eyes:      Extraocular Movements: Extraocular movements intact.      Pupils: Pupils are equal, round, and reactive to light.   Cardiovascular:      Rate and Rhythm: Normal rate and regular rhythm.      Heart sounds: No murmur heard.  Pulmonary:      Effort: No respiratory distress.      Breath sounds: No wheezing, rhonchi or rales.   Abdominal:      General: Bowel sounds are normal. There is no distension.      Palpations: Abdomen is soft.      Tenderness: There is no abdominal tenderness. There is no right CVA tenderness or left CVA tenderness.   Musculoskeletal:         General: No swelling or tenderness.      Cervical back: Neck supple. No rigidity or tenderness.   Lymphadenopathy:      Cervical: No cervical adenopathy.   Skin:     Findings: No lesion or rash.   Neurological:      General: No focal deficit present.      Mental Status: She is alert and oriented to person, place, and time. Mental status is at baseline.      Cranial Nerves: No cranial nerve deficit.      Motor: No weakness.   Psychiatric:         Mood and Affect: Mood normal.         Behavior: Behavior normal.        Labs: Reviewed most recent relevant labs available, notable results highlighted in this note    Imaging: Reviewed most recent relevant imaging studies available, notable results highlighted in this note    Assessment:       Problem List Items Addressed This Visit          Pulmonary    Asthma       ID    TB lung, latent - Primary (Chronic)    Relevant Orders    CBC Auto Differential    Comprehensive Metabolic Panel    Hepatitis A antibody, IgG    Hepatitis A Antibody, IgM    Hepatitis B Core Antibody, IgM    Hepatitis B Core Antibody, Total    Hepatitis B Surface Ab, Qualitative    Hepatitis B Surface Antigen    Hepatitis C Antibody    HIV 1/2 Ag/Ab (4th Gen)    SYPHILIS ANTIBODY (WITH REFLEX RPR)    Quantiferon Gold TB    CT Chest Without Contrast     Other Visit Diagnoses       Hashimoto's disease         Relevant Orders    CBC Auto Differential    Comprehensive Metabolic Panel    Hepatitis A antibody, IgG    Hepatitis A Antibody, IgM    Hepatitis B Core Antibody, IgM    Hepatitis B Core Antibody, Total    Hepatitis B Surface Ab, Qualitative    Hepatitis B Surface Antigen    Hepatitis C Antibody    HIV 1/2 Ag/Ab (4th Gen)    SYPHILIS ANTIBODY (WITH REFLEX RPR)    Quantiferon Gold TB    Routine screening for STI (sexually transmitted infection)        Relevant Orders    CBC Auto Differential    Comprehensive Metabolic Panel    Hepatitis A antibody, IgG    Hepatitis A Antibody, IgM    Hepatitis B Core Antibody, IgM    Hepatitis B Core Antibody, Total    Hepatitis B Surface Ab, Qualitative    Hepatitis B Surface Antigen    Hepatitis C Antibody    HIV 1/2 Ag/Ab (4th Gen)    SYPHILIS ANTIBODY (WITH REFLEX RPR)    Quantiferon Gold TB    Hemoptysis        Relevant Orders    CBC Auto Differential    Comprehensive Metabolic Panel    Hepatitis A antibody, IgG    Hepatitis A Antibody, IgM    Hepatitis B Core Antibody, IgM    Hepatitis B Core Antibody, Total    Hepatitis B Surface Ab, Qualitative    Hepatitis B Surface Antigen    Hepatitis C Antibody    HIV 1/2 Ag/Ab (4th Gen)    SYPHILIS ANTIBODY (WITH REFLEX RPR)    Quantiferon Gold TB    CT Chest Without Contrast               Plan:       -Likely latent TB, unclear exposure or risk factor however  -She had episodes of hemoptysis and reported severe illness in 2020 with unclear full workup  -CXR done in 11/2022 with report not showing any cardiopulmonary concerns  -Currently no B symptoms and no respiratory symptoms  -However given the previous episodes of hemoptysis and unclear mode of acquisition of infection, would prefer to undergo a CT scan of the chest to get a better image of the lungs  -Will also send for a STI screen including Syphilis, Hepatitis, HIV  -Concern about the patient's current medication namely Atogepant  and hormonal contraceptives which have a  significant interaction with Rifampin/Rifapentine  -Rifamycins will likely decrease the levels and effect of both these medication however are not absolute contraindications  -Advised the patient that birth control may not be effective while on Rifamycin products and that she should use alternate/backup birth control methods should this be necessary   -Discussed other side effects of this regimen including liver toxicity, neuropathy  -Will plan to use Rifampin and INH for 3 months with Pyridoxine daily rather than weekly Rifapentine and INH dosing given patient's multiple prior drug intolerances and would prefer to use shorter acting medication to prevent longer lasting side effects should they occur\  -Advised patient to contact my office and stop taking the medication should any side effects arise    No follow-ups on file.    65 minutes of total time spent on the encounter, which includes face to face time and non-face to face time preparing to see the patient (eg, review of tests), Obtaining and/or reviewing separately obtained history, Documenting clinical information in the electronic or other health record, Independently interpreting results (not separately reported) and communicating results to the patient/family/caregiver, or Care coordination (not separately reported).

## 2023-04-05 NOTE — LETTER
April 5, 2023      [facility]  [address] [place] [address] [address] TATYANA FERRELL [id number]  Phone: [phone]       Patient: Tiesha   YOB: 2000  Date of Visit: 04/05/2023    To Whom It May Concern:    Jermaine Cao  was at [facility] on 04/05/2023. The patient may return to work/school on 04/06/2023with no restrictions. If you have any questions or concerns, or if I can be of further assistance, please do not hesitate to contact me.    Sincerely,    Deysi Rosenberg MA

## 2023-04-13 ENCOUNTER — TELEPHONE (OUTPATIENT)
Dept: INFECTIOUS DISEASES | Facility: CLINIC | Age: 23
End: 2023-04-13
Payer: COMMERCIAL

## 2023-04-13 NOTE — TELEPHONE ENCOUNTER
Called [facility]  regarding CT chest w/o for PA.  Spoke with Tara, she advised no PA required for   Ct chest w/o...  Ref# [id number]    Called kenya with [facility], she will contact patient to schedule ct. Reference # given.

## 2023-04-21 ENCOUNTER — OFFICE VISIT (OUTPATIENT)
Dept: BEHAVIORAL HEALTH | Facility: CLINIC | Age: 23
End: 2023-04-21
Payer: COMMERCIAL

## 2023-04-21 VITALS
HEIGHT: 62 IN | RESPIRATION RATE: 20 BRPM | HEART RATE: 82 BPM | OXYGEN SATURATION: 98 % | WEIGHT: 109.81 LBS | TEMPERATURE: 98 F | BODY MASS INDEX: 20.21 KG/M2 | DIASTOLIC BLOOD PRESSURE: 82 MMHG | SYSTOLIC BLOOD PRESSURE: 119 MMHG

## 2023-04-21 DIAGNOSIS — F90.2 ATTENTION DEFICIT HYPERACTIVITY DISORDER (ADHD), COMBINED TYPE: Primary | ICD-10-CM

## 2023-04-21 DIAGNOSIS — F33.41 RECURRENT MAJOR DEPRESSIVE DISORDER, IN PARTIAL REMISSION: ICD-10-CM

## 2023-04-21 DIAGNOSIS — F41.1 GENERALIZED ANXIETY DISORDER: ICD-10-CM

## 2023-04-21 LAB
AMPHET UR QL SCN: NEGATIVE
BARBITURATE SCN PRESENT UR: NEGATIVE
BENZODIAZ UR QL SCN: NEGATIVE
CANNABINOIDS UR QL SCN: NEGATIVE
COCAINE UR QL SCN: NEGATIVE
FENTANYL UR QL SCN: NEGATIVE
MDMA UR QL SCN: NEGATIVE
OPIATES UR QL SCN: NEGATIVE
PCP UR QL: NEGATIVE
PH UR: 7 [PH] (ref 3–11)
SPECIFIC GRAVITY, URINE AUTO (.000) (OHS): 1.01 (ref 1–1.03)

## 2023-04-21 PROCEDURE — 99214 OFFICE O/P EST MOD 30 MIN: CPT | Mod: PBBFAC,PN | Performed by: STUDENT IN AN ORGANIZED HEALTH CARE EDUCATION/TRAINING PROGRAM

## 2023-04-21 PROCEDURE — 80307 DRUG TEST PRSMV CHEM ANLYZR: CPT | Performed by: STUDENT IN AN ORGANIZED HEALTH CARE EDUCATION/TRAINING PROGRAM

## 2023-04-21 PROCEDURE — 99205 OFFICE O/P NEW HI 60 MIN: CPT | Mod: S$PBB,,, | Performed by: STUDENT IN AN ORGANIZED HEALTH CARE EDUCATION/TRAINING PROGRAM

## 2023-04-21 PROCEDURE — 99205 PR OFFICE/OUTPT VISIT, NEW, LEVL V, 60-74 MIN: ICD-10-PCS | Mod: S$PBB,,, | Performed by: STUDENT IN AN ORGANIZED HEALTH CARE EDUCATION/TRAINING PROGRAM

## 2023-04-21 NOTE — PROGRESS NOTES
"Outpatient Psychiatry Initial Visit    4/21/2023    Oskar Cao, a 22 y.o. female, presenting for initial evaluation visit. Met with patient.    Reason for Encounter:   Referred from: Jade Jaramillo MD  Reason for referral: "Difficulty concentrating"  Chief complaint:  ADHD evaluation and treatment    History of Present Illness:   Pt is a 23yo F w/ PPHx of ADHD  who presents to psychiatry clinic for evaluation.      Pt notes she was diagnosed with ADHD 10 yrs ago by psychologist or psychiatrist (also diagnosed with depression, anxiety, OCD and PMDD).  Was started on depression medication with no positive effect.  Notes medications made her feel more depressed.  Pt fell out of treatment with provider.  Now returns for ADHD treatment, currently in grad school.  Started having ADHD symptoms in elementary school.  Noted problems with staying seated, talking in class, paying attention, following rules.  Notes frequent discipline due to behavioral problems.  Denies talking back to teachers or getting into fights.  Occasional nursing home for fight and talking in class.  Denies suspension or expulsion.  Was able to manage symptoms better in high school.  Made As-Bs in school.  HS grad, college grad.  Now in grad program for behavioral analysis and having hard time studying and completing coursework.  Has never been prescribed medication for focusing.      Regarding depression, pt endorses history of depressive episodes.  Episodes usually last months in duration.  Episodes are usually associated with identifiable triggers.  Depressive mood associated with change in appetite, no change in sleep, poo concentration, restless energy, denies anhedonia, poor motivation, +irritability, occasional hopelessness.  Denies history of suicidal thoughts, denies history of suicide attempts.      Denies history of episodes concerning for keli/hypomania.      Denies history of hallucinations or other altered perceptions, denies " "paranoid ideation.      Endorses excess worry/anxiety.  Endorses growing up with excessive anxiety.  Worries are about wide variety of topics.  Notes associated symptoms: + rumination, + sleep difficulty, poor concentration, + irritability, + tension or feeling "on edge," + muscle tension, denies HA, + GI upset.      Denies history of significant traumatic events.   Denies post traumatic symptoms.      Meds Hx (has pt taken the following):   SSRIs: prozac (helpful, stopped due to SE), lexapro (helpful, stopped due to SE), zoloft (helpful, stopped due to SE)  SNRIs: denies  TCAs: amitriptyline (for sleep, helpful, no SE)  Atypical ADs: wellbutrin?  Anxiolytics: buspirone (never took)  Neuroleptics: denies  Mood stabilizers: denies  Stimulants: denies  Other: denies    History:     Allergies:  Imitrex [sumatriptan]; Adhesive tape-silicones; Aloe vera latex gloves [gloves, latex with aloe vera]; Penicillin; Penicillins; and Latex    Past Medical/Surgical History:  Past Medical History:   Diagnosis Date    Anemia     Asthma     Hashimoto's disease     Migraines      Past Surgical History:   Procedure Laterality Date    ADENOIDECTOMY      MYRINGOTOMY W/ TUBES      TONSILLECTOMY      WISDOM TOOTH EXTRACTION         Medications  Outpatient Encounter Medications as of 4/21/2023   Medication Sig Dispense Refill    albuterol sulfate (VENTOLIN INHL) Inhale into the lungs.      amitriptyline (ELAVIL) 10 MG tablet Take 10 mg by mouth nightly.      atogepant 10 mg Tab Take 10 mg by mouth once daily.      ferrous sulfate (FEOSOL) Tab tablet Take 1 tablet by mouth daily with breakfast.      fluconazole (DIFLUCAN) 150 MG Tab Take 1 tablet by mouth once daily at 6am. As needede      LO LOESTRIN FE 1 mg-10 mcg (24)/10 mcg (2) Tab Take 1 tablet by mouth once daily.      selenium 50 mcg Tab Take 50 mcg by mouth once daily.       No facility-administered encounter medications on file as of 4/21/2023.       Past Psychiatric " History:  Previous Medication Trials: See above   Previous Psychiatric Hospitalizations: denies   Previous Suicide Attempts: denies   History of Violence: None in past 6 months  Outpatient mental health: saw psychiatrist 10 yrs ago  Family History: father with depression and ADHD, brother with ADhD, aunt with bipolar, P grandmother with anxiety and depression, M grandmother with depression    Social History:  Marital Status: engaged  Children: denies   Employment Status/Info: working as behavioral therapist  Education: HS grad, college grade  Housing Status: lives in apartment with fiancee and roommate  History of phys/sexual abuse: denies, denies currently abusive relationship  Access to gun: denies    Substance Abuse History:  Tobacco Use: denies  Use of Alcohol: denies  Recreational Drugs: denies  Rehab/detox: denies    Legal History:  Past Charges/Incarcerations: denies   Pending charges: denies     Psychosocial Stressors: family, health, occupational, and academic    Review Of Systems:     Constitutional: denies fevers, denies chills, denies recent weight change  Eyes: denies pain in eyes or loss of vision  Ears: denies tinnitis, denies loss of hearing  Mouth/throat: denies difficulty with speaking, denies difficulty with swallowing  Cardiac: denies CP, denies palpitations  Respiratory: denies SOB, denies cough  Gastrointestinal: denies abdominal pain, denies nausea/vomiting, denies constipation/diarrhea  Genitourinary: denies urinary frequency, denies burning on urination  Dermatologic: denies rash, denies erythema  Musculoskeletal: denies myalgias, denies arthralgias  Hematologic: denies easy bleeding/bruising, denies enlarged lymph nodes  Neurologic: denies seizures, denies headaches, denies loss of sensation, denies weakness  Psychiatric: see HPI    Current Evaluation:     Nutritional Screening: Considering the patient's height and weight, medications, medical history and preferences, should a referral be  "made to the dietitian? no    Constitutional  Vitals:  Most recent vital signs, dated less than 90 days prior to this appointment, were reviewed.    Vitals:    04/21/23 1006   BP: 119/82   Pulse: 82   Resp: 20   Temp: 98.1 °F (36.7 °C)   TempSrc: Oral   SpO2: 98%   Weight: 49.8 kg (109 lb 12.8 oz)   Height: 5' 2" (1.575 m)      General:  No acute distress     Neurologic:   Motor: moves all extremities spontaneously and without difficulty  Gait: normal gait and station    Mental status examination:  Appearance: unremarkable, age appropriate  Level of Consciousness: awake and alert  Behavior/Cooperation: calm and cooperative  Psychomotor: unremarkable  Speech: normal tone, normal rate, normal pitch, normal volume  Language: english, fluid  Orientation: grossly intact, person, place, situation, day of week, month of year, year  Attention Span/Concentration: intact to interview and spells "WORLD" forwards and backwards without error  Memory: Registers 3/3 objects, recalls 3/3 objects at 5 minutes without cuing  Mood: "nervous"  Affect: mood congruent and euthymic  Thought Process: linear, goal-directed  Associations: Logical and appropriate  Thought Content: denies SI/HI/paranoia, no delusional ideation volunteered, denies plan or desire for self harm or harm to others  Fund of Knowledge: appropriate for education  Abstraction: proverbs were abstract and similarities were abstract  Insight: good  Judgment: good    Relevant Elements of Neurological Exam: no abnormal involuntary movements observed    Functioning in Relationships:  Spouse/partner: good  Peers: good  Employers: good    Assessments:   PHQ9:   PHQ9 4/21/2023   Total Score 15     GAD7:   GAD7 4/21/2023   1. Feeling nervous, anxious, or on edge? 3   2. Not being able to stop or control worrying? 3   3. Worrying too much about different things? 3   4. Trouble relaxing? 3   5. Being so restless that it is hard to sit still? 3   6. Becoming easily annoyed or " irritable? 3   7. Feeling afraid as if something awful might happen? 3   8. If you checked off any problems, how difficult have these problems made it for you to do your work, take care of things at home, or get along with other people? 3   SRI-7 Score 21     ASRS:   Section A: 6 positive responses  Section B: 12 positive responses  Overall: positive screen for adult ADHD    Laboratory Data  Lab Visit on 04/05/2023   Component Date Value Ref Range Status    Sodium Level 04/05/2023 138  136 - 145 mmol/L Final    Potassium Level 04/05/2023 3.8  3.5 - 5.1 mmol/L Final    Chloride 04/05/2023 107  98 - 107 mmol/L Final    Carbon Dioxide 04/05/2023 26  22 - 29 mmol/L Final    Glucose Level 04/05/2023 81  74 - 100 mg/dL Final    Blood Urea Nitrogen 04/05/2023 8.9  7.0 - 18.7 mg/dL Final    Creatinine 04/05/2023 0.81  0.55 - 1.02 mg/dL Final    Calcium Level Total 04/05/2023 9.5  8.4 - 10.2 mg/dL Final    Protein Total 04/05/2023 6.9  6.4 - 8.3 gm/dL Final    Albumin Level 04/05/2023 4.0  3.5 - 5.0 g/dL Final    Globulin 04/05/2023 2.9  2.4 - 3.5 gm/dL Final    Albumin/Globulin Ratio 04/05/2023 1.4  1.1 - 2.0 ratio Final    Bilirubin Total 04/05/2023 0.5  <=1.5 mg/dL Final    Alkaline Phosphatase 04/05/2023 48  40 - 150 unit/L Final    Alanine Aminotransferase 04/05/2023 12  0 - 55 unit/L Final    Aspartate Aminotransferase 04/05/2023 24  5 - 34 unit/L Final    eGFR 04/05/2023 >60  mls/min/1.73/m2 Final    Hep A IGG 04/05/2023 Reactive (A)  Nonreactive Final    Hepatitis A IgM 04/05/2023 Nonreactive  Nonreactive Final    Hepatitis B Core IgM 04/05/2023 Nonreactive  Nonreactive Final    Hepatitis B Core Antibody 04/05/2023 Nonreactive  Nonreactive Final    Hepatitis B Surface Antibody 04/05/2023 Nonreactive  Nonreactive Final    Hepatitis B Surface Antibody Qnt 04/05/2023 1.80  mIU/mL Final    Hepatitis B Surface Antigen 04/05/2023 Nonreactive  Nonreactive Final    Hepatitis C Antibody 04/05/2023 Nonreactive  Nonreactive  Final    HIV 04/05/2023 Nonreactive  Nonreactive Final    Syphilis Antibody 04/05/2023 Nonreactive  Nonreactive, Equivocal Final    QuantiFERON-Tb Gold Plus Result 04/05/2023 Positive (A)  Negative Final    TB1 Ag minus Nil Result 04/05/2023 0.56  IU/mL Final    TB2 Ag minus Nil Result 04/05/2023 0.24  IU/mL Final    Mitogen minus Nil Result 04/05/2023 >10.00  IU/mL Final    Nil Result 04/05/2023 0.58  IU/mL Final    WBC 04/05/2023 5.7  4.5 - 11.5 x10(3)/mcL Final    RBC 04/05/2023 4.49  4.20 - 5.40 x10(6)/mcL Final    Hgb 04/05/2023 13.1  12.0 - 16.0 g/dL Final    Hct 04/05/2023 40.6  37.0 - 47.0 % Final    MCV 04/05/2023 90.4  80.0 - 94.0 fL Final    MCH 04/05/2023 29.2  27.0 - 31.0 pg Final    MCHC 04/05/2023 32.3 (L)  33.0 - 36.0 g/dL Final    RDW 04/05/2023 12.6  11.5 - 17.0 % Final    Platelet 04/05/2023 347  130 - 400 x10(3)/mcL Final    MPV 04/05/2023 9.3  7.4 - 10.4 fL Final    Neut % 04/05/2023 54.7  % Final    Lymph % 04/05/2023 33.2  % Final    Mono % 04/05/2023 8.3  % Final    Eos % 04/05/2023 2.5  % Final    Basophil % 04/05/2023 1.1  % Final    Lymph # 04/05/2023 1.88  0.6 - 4.6 x10(3)/mcL Final    Neut # 04/05/2023 3.11  2.1 - 9.2 x10(3)/mcL Final    Mono # 04/05/2023 0.47  0.1 - 1.3 x10(3)/mcL Final    Eos # 04/05/2023 0.14  0 - 0.9 x10(3)/mcL Final    Baso # 04/05/2023 0.06  0 - 0.2 x10(3)/mcL Final    IG# 04/05/2023 0.01  0 - 0.04 x10(3)/mcL Final    IG% 04/05/2023 0.2  % Final    NRBC% 04/05/2023 0.0  % Final     Assessment - Diagnosis - Goals:     Oskar Cao, a 22 y.o. female, presenting for initial evaluation visit.     Impression:       ICD-10-CM ICD-9-CM   1. Attention deficit hyperactivity disorder (ADHD), combined type  F90.2 314.01   2. Generalized anxiety disorder  F41.1 300.02   3. Recurrent major depressive disorder, in partial remission  F33.41 296.35     Strengths and Liabilities: Strength: Patient accepts guidance/feedback, Strength: Patient is expressive/articulate.,  Strength: Patient is intelligent.    Treatment Goals:  Specify outcomes written in observable, behavioral terms:   Anxiety: reducing physical symptoms of anxiety and reducing time spent worrying (<30 minutes/day)  Depression: increasing energy, increasing interest in usual activities, increasing motivation, and reducing fatigue    Treatment Plan/Recommendations:   Will obtain UDS and (if unremarkable) will plan to start psychostimulant (likely adderall XR 15mg daily), Discussed potential SE including but not limited to poor appetite, weight loss, insomnia, palpitations, chest pain, addictive behavior  Discussed need for pt to maintain control of her controlled medications, discussed need for medication to always be stored and/or transported in original prescription vial, discussed that pt should never share her medications with anyone  Will assess response of anxiety and depression symptoms to stimulant treatment and treat as separate problem if needed  Recent labwork in EMR reviewed, CBC and CMP wnl, TSH 2 yrs ago wnl, quantiferon gold + (in treatment with ID)  Ordered UDS  No need for PEC as pt is not an imminent danger to self or others or gravely disabled due to acute psychiatric illness  Discussed that pt should either call clinic for psychiatric crisis symptoms or present to nearest emergency room    Discussed with patient informed consent including diagnosis, risks and benefits of proposed treatment above vs. alternative treatments vs. no treatment, as well as serious and common side effects of these treatments, and the inherent unpredictability of individual responses to these treatments. The patient expresses understanding of the above and displays the capacity to agree with this current plan. Patient also agrees that, currently, the benefits outweigh the risks and would like to pursue treatment at this time, and had no other questions.    Instructions:  Take all medications as prescribed.    Abstain from  recreational drugs and alcohol.  Present to ED or call 911 for SI/HI plan or intent, psychosis, or medical emergency.    Return to Clinic: Follow up in about 2 weeks (around 5/5/2023) for Virtual Visit.    Total time:   Complexity (level) of medical decision making employed in the encounter: MODERATE    The total time for services performed on the date of the encounter (including review of prior visit notes, review of notes from other providers, review of results from laboratory/imaging studies, face-to-face time with patient, and time spent on other activities directly related to patient care): 60 minutes.    Baldomero Ahuja MD  [facility]

## 2023-04-24 DIAGNOSIS — F90.2 ATTENTION DEFICIT HYPERACTIVITY DISORDER (ADHD), COMBINED TYPE: Primary | ICD-10-CM

## 2023-04-24 RX ORDER — DEXTROAMPHETAMINE SACCHARATE, AMPHETAMINE ASPARTATE MONOHYDRATE, DEXTROAMPHETAMINE SULFATE AND AMPHETAMINE SULFATE 3.75; 3.75; 3.75; 3.75 MG/1; MG/1; MG/1; MG/1
15 CAPSULE, EXTENDED RELEASE ORAL DAILY
Qty: 30 CAPSULE | Refills: 0 | Status: SHIPPED | OUTPATIENT
Start: 2023-04-24 | End: 2023-05-08 | Stop reason: ALTCHOICE

## 2023-04-24 NOTE — PROGRESS NOTES
UDS resulted, negative for all substances.  Rx for Adderall Xr 15mg daily sent to pt's pharmacy.  Pt provided IC at initial visit.  Will follow up with patient as currently scheduled and adjust dose as needed.

## 2023-04-27 ENCOUNTER — TELEPHONE (OUTPATIENT)
Dept: FAMILY MEDICINE | Facility: CLINIC | Age: 23
End: 2023-04-27
Payer: COMMERCIAL

## 2023-04-27 NOTE — TELEPHONE ENCOUNTER
"Pt states she started the medication on Tuesday and both that evening and yesterday evening between 7-8pm once "coming down" from the medication she had severe nausea, was dry heaving and even threw up last night which she thinks may because she had just eaten.    Pt said that she is sensitive to medication but just wanted to make sure this side effect was common or not.    Please advise  "

## 2023-05-03 ENCOUNTER — OFFICE VISIT (OUTPATIENT)
Dept: ORTHOPEDICS | Facility: CLINIC | Age: 23
End: 2023-05-03
Payer: OTHER MISCELLANEOUS

## 2023-05-03 VITALS — BODY MASS INDEX: 20.06 KG/M2 | WEIGHT: 109 LBS | HEIGHT: 62 IN

## 2023-05-03 DIAGNOSIS — S63.642A RUPTURE OF RADIAL COLLATERAL LIGAMENT OF LEFT THUMB, INITIAL ENCOUNTER: Primary | ICD-10-CM

## 2023-05-03 PROCEDURE — 99213 PR OFFICE/OUTPT VISIT, EST, LEVL III, 20-29 MIN: ICD-10-PCS | Mod: ,,, | Performed by: STUDENT IN AN ORGANIZED HEALTH CARE EDUCATION/TRAINING PROGRAM

## 2023-05-03 PROCEDURE — 99213 OFFICE O/P EST LOW 20 MIN: CPT | Mod: ,,, | Performed by: STUDENT IN AN ORGANIZED HEALTH CARE EDUCATION/TRAINING PROGRAM

## 2023-05-03 NOTE — PROGRESS NOTES
Chief Complaint:  Left thumb injury    Consulting Physician: No ref. provider found    History of present illness:    Patient is a 21-year-old female who presents for follow-up for left thumb injury.  She has a left thumb radial collateral ligament injury which I am treating non operatively.  Her thumb pain is improving although it has not fully improved yet. She is now 6 months out from injury.     Past Medical History:   Diagnosis Date    Anemia     Asthma     Hashimoto's disease     Hx of tuberculosis 05/03/2023    Migraines        Past Surgical History:   Procedure Laterality Date    ADENOIDECTOMY      MYRINGOTOMY W/ TUBES      TONSILLECTOMY      WISDOM TOOTH EXTRACTION         Current Outpatient Medications   Medication Sig    albuterol sulfate (VENTOLIN INHL) Inhale into the lungs.    amitriptyline (ELAVIL) 10 MG tablet Take 10 mg by mouth nightly.    atogepant 10 mg Tab Take 10 mg by mouth once daily.    dextroamphetamine-amphetamine (ADDERALL XR) 15 MG 24 hr capsule Take 1 capsule (15 mg total) by mouth once daily.    ferrous sulfate (FEOSOL) Tab tablet Take 1 tablet by mouth daily with breakfast.    fluconazole (DIFLUCAN) 150 MG Tab Take 1 tablet by mouth once daily at 6am. As needede    LO LOESTRIN FE 1 mg-10 mcg (24)/10 mcg (2) Tab Take 1 tablet by mouth once daily.    selenium 50 mcg Tab Take 50 mcg by mouth once daily.     No current facility-administered medications for this visit.       Review of patient's allergies indicates:   Allergen Reactions    Imitrex [sumatriptan]      stroke    Adhesive tape-silicones     Aloe vera latex gloves [gloves, latex with aloe vera]     Penicillin Hives    Penicillins     Latex Itching and Rash       Family History   Problem Relation Age of Onset    Vision loss Mother     Mental illness Father     Hyperlipidemia Father     Hypertension Father     Depression Maternal Grandmother     Cancer Paternal Grandfather     Learning disabilities Brother        Social History  "    Socioeconomic History    Marital status: Single   Occupational History    Occupation: Behavioral  therapy   Tobacco Use    Smoking status: Never    Smokeless tobacco: Never   Substance and Sexual Activity    Alcohol use: Not Currently    Drug use: Never    Sexual activity: Yes     Partners: Male     Birth control/protection: OCP       Review of Systems:    Constitution:   Denies chills, fever, and sweats.  HENT:   Denies headaches or blurry vision.  Cardiovascular:  Denies chest pain or irregular heart beat.  Respiratory:   Denies cough or shortness of breath.  Gastrointestinal:  Denies abdominal pain, nausea, or vomiting.  Musculoskeletal:   Denies muscle cramps.  Neurological:   Denies dizziness or focal weakness.  Psychiatric/Behavior: Normal mental status.  Hematology/Lymph:  Denies bleeding problem or easy bruising/bleeding.  Skin:    Denies rash or suspicious lesions.    Examination:    Vital Signs:    Vitals:    05/03/23 1615   Weight: 49.4 kg (109 lb)   Height: 5' 2" (1.575 m)       Body mass index is 19.94 kg/m².    Constitution:   Well-developed, well nourished patient in no acute distress.  Neurological:   Alert and oriented x 3 and cooperative to examination.     Psychiatric/Behavior: Normal mental status.  Respiratory:   No shortness of breath.  Eyes:    Extraoccular muscles intact  Skin:    No scars, rash or suspicious lesions.    MSK:   Left thumb: No open wounds or rashes.  Mild Tenderness to palpation over the radial collateral ligament at the MP joint.  No tenderness to palpation over the ulnar collateral ligament MP joint.  Thumb is stable to ulnar directed stress.  The thumb is stable with radially directed stress.  She can make a fist and extend her digits.  She is neurovascular intact her hand is warm well perfused    Imaging:   No new imaging    MRI of the left thumb shows radial collateral ligament is intact but it is sprained with some edema superficial to the ligament.     Assessment:  " Left radial collateral ligament tear    Plan:  Her thumb feels stable and she is now 6 months out from injury. She can now return to full activity as tolerated. She is clear to work with no restrictions. She may follow up as needed     Follow Up:  As needed  Xray at next visit:  None

## 2023-05-03 NOTE — LETTER
May 3, 2023       [facility]  [address], [address]  [place] [address]  Phone: [phone]  Fax: [phone]       Patient: Oskar Cao   YOB: 2000  Date of Visit: 05/03/2023    To Whom It May Concern:    Jermaine Cao  was at [facility] on 05/03/2023. The patient may return to work on 05/04/2023 with no restrictions. If you have any questions or concerns, or if I can be of further assistance, please do not hesitate to contact me.    Sincerely,    Dr. Jamar Moreau MD

## 2023-05-08 ENCOUNTER — OFFICE VISIT (OUTPATIENT)
Dept: URGENT CARE | Facility: CLINIC | Age: 23
End: 2023-05-08
Payer: COMMERCIAL

## 2023-05-08 ENCOUNTER — OFFICE VISIT (OUTPATIENT)
Dept: BEHAVIORAL HEALTH | Facility: CLINIC | Age: 23
End: 2023-05-08
Payer: OTHER MISCELLANEOUS

## 2023-05-08 VITALS
DIASTOLIC BLOOD PRESSURE: 86 MMHG | OXYGEN SATURATION: 99 % | HEART RATE: 81 BPM | SYSTOLIC BLOOD PRESSURE: 126 MMHG | TEMPERATURE: 99 F | WEIGHT: 109 LBS | RESPIRATION RATE: 18 BRPM | HEIGHT: 62 IN | BODY MASS INDEX: 20.06 KG/M2

## 2023-05-08 DIAGNOSIS — R21 RASH: Primary | ICD-10-CM

## 2023-05-08 DIAGNOSIS — F90.2 ATTENTION DEFICIT HYPERACTIVITY DISORDER (ADHD), COMBINED TYPE: Primary | ICD-10-CM

## 2023-05-08 DIAGNOSIS — F33.1 MODERATE EPISODE OF RECURRENT MAJOR DEPRESSIVE DISORDER: ICD-10-CM

## 2023-05-08 DIAGNOSIS — F41.1 GAD (GENERALIZED ANXIETY DISORDER): ICD-10-CM

## 2023-05-08 PROCEDURE — 96372 THER/PROPH/DIAG INJ SC/IM: CPT | Mod: ,,,

## 2023-05-08 PROCEDURE — 99213 OFFICE O/P EST LOW 20 MIN: CPT | Mod: 25,,,

## 2023-05-08 PROCEDURE — 99213 PR OFFICE/OUTPT VISIT, EST, LEVL III, 20-29 MIN: ICD-10-PCS | Mod: 25,,,

## 2023-05-08 PROCEDURE — 96372 PR INJECTION,THERAP/PROPH/DIAG2ST, IM OR SUBCUT: ICD-10-PCS | Mod: ,,,

## 2023-05-08 PROCEDURE — 99214 PR OFFICE/OUTPT VISIT, EST, LEVL IV, 30-39 MIN: ICD-10-PCS | Mod: 95,,, | Performed by: STUDENT IN AN ORGANIZED HEALTH CARE EDUCATION/TRAINING PROGRAM

## 2023-05-08 PROCEDURE — 99214 OFFICE O/P EST MOD 30 MIN: CPT | Mod: 95,,, | Performed by: STUDENT IN AN ORGANIZED HEALTH CARE EDUCATION/TRAINING PROGRAM

## 2023-05-08 RX ORDER — DEXAMETHASONE 4 MG/1
2 TABLET ORAL 2 TIMES DAILY
COMMUNITY
Start: 2023-04-13

## 2023-05-08 RX ORDER — LISDEXAMFETAMINE DIMESYLATE CAPSULES 20 MG/1
20 CAPSULE ORAL EVERY MORNING
Qty: 30 CAPSULE | Refills: 0 | Status: SHIPPED | OUTPATIENT
Start: 2023-05-08 | End: 2023-06-05 | Stop reason: SDUPTHER

## 2023-05-08 RX ORDER — CEPHALEXIN 500 MG/1
500 CAPSULE ORAL EVERY 8 HOURS
Qty: 15 CAPSULE | Refills: 0 | Status: SHIPPED | OUTPATIENT
Start: 2023-05-08 | End: 2023-05-08

## 2023-05-08 RX ORDER — PREDNISONE 20 MG/1
20 TABLET ORAL DAILY
Qty: 5 TABLET | Refills: 0 | Status: SHIPPED | OUTPATIENT
Start: 2023-05-08 | End: 2023-05-08

## 2023-05-08 RX ORDER — ISONIAZID 300 MG/1
300 TABLET ORAL DAILY
COMMUNITY

## 2023-05-08 RX ORDER — CEPHALEXIN 500 MG/1
500 CAPSULE ORAL EVERY 8 HOURS
Qty: 15 CAPSULE | Refills: 0 | Status: SHIPPED | OUTPATIENT
Start: 2023-05-08 | End: 2023-05-13

## 2023-05-08 RX ORDER — DEXAMETHASONE SODIUM PHOSPHATE 100 MG/10ML
5 INJECTION INTRAMUSCULAR; INTRAVENOUS
Status: COMPLETED | OUTPATIENT
Start: 2023-05-08 | End: 2023-05-08

## 2023-05-08 RX ORDER — PREDNISONE 20 MG/1
20 TABLET ORAL DAILY
Qty: 5 TABLET | Refills: 0 | Status: SHIPPED | OUTPATIENT
Start: 2023-05-08 | End: 2023-05-13

## 2023-05-08 RX ADMIN — DEXAMETHASONE SODIUM PHOSPHATE 5 MG: 100 INJECTION INTRAMUSCULAR; INTRAVENOUS at 05:05

## 2023-05-08 NOTE — PROGRESS NOTES
"Outpatient Psychiatry Follow-Up Visit    5/8/2023    Clinical Status of Patient:  Outpatient (Ambulatory)    Chief Complaint:  Oskar Cao is a 22 y.o. female who presents today for follow-up of MDD, SRI, ADHD. Patient last seen for initial evaluation on 4/21/2023. Met with patient.      TELE PSYCHIATRY Disclaimer   *The patient was informed despite using HIPPA compliant technology there may be risks including security breach, technological failure, inability to perform a comprehensive physical exam which could delay or prevent an accurate diagnosis, and potential complications from treatment decisions rendered over a telemedical platform.   The patient was also informed of the relationship between the physician and patient and the respective role of any other health care provider with respect to management of the patient; and notified that the pt may decline to receive medical services by telemedicine and may withdraw from such care at any time.     Patient's Current location: [facility]    In Case of Emergency pts next of kin  Name:Vivi Ace (mother)  Phone number:  [phone]  Visit type: Virtual visit with synchronous audio and video  Total time spent with patient: 27 minutes    Interval History and Content of Current Session:  Interim Events/Subjective Report/Content of Current Session:   Pt reports doing "ok"  overall.  Notes poor reaction to adderall, SE of nausea, vomiting and gagging.  Notes prominent "crash" effect at end of the day. Reports "ok" mood, improved anxiety  Sleeping unchanged.  Appetite unchanged, weight stable.  Energy improved, motivation improved.  Endorses irritability, denies hopelessness.  Denies SI/HI/AVH/paranoia, denies plan or desire for self harm or harm to others.    Pt voices desire to adjust regimen to address ongoing symptoms.      Review of Systems   PSYCHIATRIC: Pertinant items are noted in the narrative.  CONSTITUTIONAL: No weight gain " "or loss.  MUSCULOSKELETAL: No pain or stiffness of the joints.  NEUROLOGIC: No weakness, sensory changes, seizures, confusion, memory loss, tremor or other abnormal movements.  CARDIAC: No CP, no palpitations  RESPIRATORY: No shortness of breath.  CARDIOVASCULAR: No tachycardia or chest pain.  GASTROINTESTINAL: No nausea, vomiting, pain, constipation or diarrhea.    Past Medical, Family and Social History: The patient's past medical, family and social history have been reviewed and updated as appropriate within the electronic medical record - see encounter notes.    Compliance: good    Side effects: nausea, gagging, vomiting from adderall    Risk Parameters:  Patient reports no suicidal ideation  Patient reports no homicidal ideation  Patient reports no self-injurious behavior  Patient reports no violent behavior    Exam (detailed: at least 9 elements; comprehensive: all 15 elements)   Constitutional  Vitals:  Most recent vital signs, dated less than 90 days prior to this appointment, were reviewed.     There were no vitals filed for this visit.       General:   Constitutional: No acute distress, appears stated age, casually dressed    Neurologic:   Motor: moves all extremities spontaneously and without difficulty, no abnormal involuntary movements observed  Gait: normal gait and station    Mental status examination:   Appearance: appears stated age, casually dressed, no acute distress  Behavior: unremarkable for situation, calm and cooperative  Mood: "ok"  Affect: mood congruent and constricted  Thought process: linear and goal directed  Associations: appropriate for conversation  Thought content: no plan or desire for self harm or harm to others, denies paranoia, no delusional ideation volunteered  Perceptions: denies hallucinations or other altered perceptions  Orientation: oriented to day of week, month, year, location, and situation  Language: English, fluid  Attention: able to attend to interview  Insight: " good  Judgement: good    PHQ9 4/21/2023   Total Score 15     GAD7 4/21/2023   1. Feeling nervous, anxious, or on edge? 3   2. Not being able to stop or control worrying? 3   3. Worrying too much about different things? 3   4. Trouble relaxing? 3   5. Being so restless that it is hard to sit still? 3   6. Becoming easily annoyed or irritable? 3   7. Feeling afraid as if something awful might happen? 3   8. If you checked off any problems, how difficult have these problems made it for you to do your work, take care of things at home, or get along with other people? 3   SRI-7 Score 21     Assessment and Diagnosis   Status/Progress: Based on the examination today, the patient's problem(s) is/are inadequately controlled.  New problems have been presented today.   Co-morbidities and Lack of compliance are not complicating management of the primary condition.  Number of separate conditions addressed during today's visit: 3 (mood unchanged, anxiety improved, ADHD inadequately controlled).  Are medication adjustments being made today: Yes.  Are referral(s) being ordered today: No.  Complexity (level) of medical decision making employed in the encounter: HIGH.    General Impression:    ICD-10-CM ICD-9-CM   1. Attention deficit hyperactivity disorder (ADHD), combined type  F90.2 314.01   2. SRI (generalized anxiety disorder)  F41.1 300.02   3. Moderate episode of recurrent major depressive disorder  F33.1 296.32     Intervention/Counseling/Treatment Plan   Discontinue Adderall XR 2/2 SE  Start vyvanse 20mg daily, discussed likely need for PA  Recent labwork in EMR reviewed, CBC and CMP wnl, TSH 2 yrs ago wnl, quantiferon gold + (in treatment with ID)  Discussed that she should inform MD if medication adjustments are needed due to treatment regimen for latent TB  UDS negative at initial visit  No need for PEC as pt is not an imminent danger to self or others or gravely disabled due to acute psychiatric illness  Discussed that  pt should either call clinic for psychiatric crisis symptoms or present to nearest emergency room    Discussed with patient informed consent including diagnosis, risks and benefits of proposed treatment above vs. alternative treatments vs. no treatment, as well as serious and common side effects of these treatments, and the inherent unpredictability of individual responses to these treatments. The patient expresses understanding of the above and displays the capacity to agree with this current plan. Patient also agrees that, currently, the benefits outweigh the risks and would like to pursue treatment at this time, and had no other questions.    Instructions:  Take all medications as prescribed.    Abstain from recreational drugs and alcohol.  Present to ED or call 911 for SI/HI plan or intent, psychosis, or medical emergency.    Return to Clinic: Follow up in about 4 weeks (around 6/5/2023).    Total time:   The total time for services performed on the date of the encounter (including review of prior visit notes, review of notes from other providers, review of results from laboratory/imaging studies, face-to-face time with patient, and time spent on other activities directly related to patient care): 27 minutes.    Baldomero Ahuja MD  [facility]

## 2023-05-08 NOTE — PATIENT INSTRUCTIONS
Use of over-the-counter Zyrtec to assist with rash.  Pain: Take OTC Tylenol or Ibuprofen per package instructions as needed for pain.    Follow up with your Primary Care Provider.  Present to the Emergency Department for any significant change or worsening symptoms including worsening redness, swelling, visual changes, purulent discharge, fever, body aches, or chills.

## 2023-05-08 NOTE — PROGRESS NOTES
"Subjective:      Patient ID: Oskar Cao is a 22 y.o. female.    Vitals:  height is 5' 2" (1.575 m) and weight is 49.4 kg (109 lb). Her oral temperature is 98.6 °F (37 °C). Her blood pressure is 126/86 and her pulse is 81. Her respiration is 18 and oxygen saturation is 99%.     Chief Complaint: Rash (Rash on left cheek - Entered by patient)     Patient is a 22 y.o.  female who presents to urgent care with complaints of raised rash to left side of cheek below eye x3 days. Patient notes scratching face Friday night, but did not notice that the skin was broken. Alleviating factors include hydrocortisone cream and topical neosporin with moderate amount of relief. Patient denies using new facial products, new medications, any outdoor exposure to irritants.  Patient denies fever, body aches, chills, visual changes.  Patient reports the rash has slowly worsened over the last 2 days.  Patient has history penicillin reports nausea and hives however denies anaphylaxis, shortness of breath or swelling.  Is unsure if she has ever had cephalosporins.      Skin:  Positive for erythema.    Objective:     Physical Exam   Constitutional: She is oriented to person, place, and time. She appears well-developed. She is cooperative.  Non-toxic appearance. She does not appear ill. No distress.   HENT:   Head: Normocephalic and atraumatic.   Ears:   Right Ear: Hearing, tympanic membrane, external ear and ear canal normal.   Left Ear: Hearing, tympanic membrane, external ear and ear canal normal.   Nose: Nose normal. No mucosal edema, rhinorrhea or nasal deformity. No epistaxis. Right sinus exhibits no maxillary sinus tenderness and no frontal sinus tenderness. Left sinus exhibits no maxillary sinus tenderness and no frontal sinus tenderness.   Mouth/Throat: Uvula is midline, oropharynx is clear and moist and mucous membranes are normal. No trismus in the jaw. Normal dentition. No uvula swelling. No posterior oropharyngeal edema. "   Eyes: Conjunctivae and lids are normal. No scleral icterus.   Neck: Trachea normal and phonation normal. Neck supple. No edema present. No erythema present. No neck rigidity present.   Cardiovascular: Normal rate, regular rhythm, normal heart sounds and normal pulses.   Pulmonary/Chest: Effort normal and breath sounds normal. No respiratory distress. She has no decreased breath sounds. She has no wheezes. She has no rhonchi.   Abdominal: Normal appearance.   Musculoskeletal: Normal range of motion.         General: No deformity. Normal range of motion.   Neurological: She is alert and oriented to person, place, and time. She exhibits normal muscle tone. Coordination normal.   Skin: Skin is warm, dry, intact, not diaphoretic, not pale and rash. erythema         Comments: There is an approximate 3 cm area of erythema with mostly raised appearance. No induration, fluctuance, open wounds or, purulence.    Psychiatric: Her speech is normal and behavior is normal. Judgment and thought content normal.   Nursing note and vitals reviewed.    Assessment:     1. Rash        Plan:       Rash  -     dexAMETHasone injection 5 mg  -     predniSONE (DELTASONE) 20 MG tablet; Take 1 tablet (20 mg total) by mouth once daily. for 5 days  Dispense: 5 tablet; Refill: 0  -     cephALEXin (KEFLEX) 500 MG capsule; Take 1 capsule (500 mg total) by mouth every 8 (eight) hours. for 5 days  Dispense: 15 capsule; Refill: 0        AS patient reports erythema started out with her possible scratch to the face, will cover with steroids, and antibiotics.  Will also cover with over-the-counter antihistamines at home.      Discussed possible infectious versus allergic response, will give steroid injection in continue with oral steroids over the next 5 days.  Will call in [facility] to treat for cellulitis due to near the eye.  Patient will call back if she has allergic reaction to Keflex due to penicillin allergy.  Patient given ER precautions for  swelling to the eye, visual changes, fever, difficulty breathing, trouble swallowing.       Use of over-the-counter Zyrtec to assist with rash.  Pain: Take OTC Tylenol or Ibuprofen per package instructions as needed for pain.    Follow up with your Primary Care Provider.  Present to the Emergency Department for any significant change or worsening symptoms including worsening redness, swelling, visual changes, purulent discharge, fever, body aches, or chills.

## 2023-05-15 ENCOUNTER — OFFICE VISIT (OUTPATIENT)
Dept: URGENT CARE | Facility: CLINIC | Age: 23
End: 2023-05-15
Payer: COMMERCIAL

## 2023-05-15 ENCOUNTER — TELEPHONE (OUTPATIENT)
Dept: FAMILY MEDICINE | Facility: CLINIC | Age: 23
End: 2023-05-15

## 2023-05-15 VITALS
HEART RATE: 85 BPM | TEMPERATURE: 98 F | WEIGHT: 109 LBS | HEIGHT: 62 IN | SYSTOLIC BLOOD PRESSURE: 137 MMHG | OXYGEN SATURATION: 98 % | RESPIRATION RATE: 18 BRPM | BODY MASS INDEX: 20.06 KG/M2 | DIASTOLIC BLOOD PRESSURE: 89 MMHG

## 2023-05-15 DIAGNOSIS — T88.7XXA MEDICATION SIDE EFFECT: Primary | ICD-10-CM

## 2023-05-15 PROCEDURE — 99213 PR OFFICE/OUTPT VISIT, EST, LEVL III, 20-29 MIN: ICD-10-PCS | Mod: ,,, | Performed by: FAMILY MEDICINE

## 2023-05-15 PROCEDURE — 99213 OFFICE O/P EST LOW 20 MIN: CPT | Mod: ,,, | Performed by: FAMILY MEDICINE

## 2023-05-15 NOTE — PROGRESS NOTES
"Subjective:      Patient ID: Oskar Cao is a 22 y.o. female.    Vitals:  height is 5' 2" (1.575 m) and weight is 49.4 kg (109 lb). Her temperature is 98.4 °F (36.9 °C). Her blood pressure is 137/89 and her pulse is 85. Her respiration is 18 and oxygen saturation is 98%.     Chief Complaint: possible rxn to medication    22 y.o. female presents to clinic w/ c/o light headed feeling, difficulty swallowing, nausea, numbness and tingling in lips, hands and throughout body began today. Reports she began taking medication for latent TB 5/7/23 and Vyvanse 5/10/23. Denies dyspnea, SOB, tightness in throat, cough, wheezing, CP, vomiting, rash.  ROS   Objective:     Physical Exam    Assessment:     No diagnosis found.    Plan:       There are no diagnoses linked to this encounter.                "

## 2023-05-15 NOTE — PROGRESS NOTES
Patient ID: [id number]     Chief Complaint: upper respiratory tract infection symptoms    History of Present Illness:     Oskar Cao is a 22 y.o. female  who presents today for symptoms of possible rxn to medication  22 y.o. female with a history of ADHD, anxiety, depression, asthma, latent tuberculosis, Hashimoto's disease, migraines, presents to clinic w/ c/o light headed feeling, difficulty swallowing, nausea, numbness and tingling in lips, hands and throughout body began today. Reports she began taking isoniazid for latent TB 5/7/23 and Vyvanse 5/10/23. Denies dyspnea, SOB, tightness in throat, cough, wheezing, CP, vomiting, rash.        Pt denies experiencing any fevers, chills, nausea, vomiting, difficulty breathing, dysphagia, or neck stiffness.    Past Medical History:     ----------------------------  Anemia  Asthma  Hashimoto's disease  Hx of tuberculosis  Migraines     Past Surgical History:   Procedure Laterality Date    ADENOIDECTOMY      MYRINGOTOMY W/ TUBES      TONSILLECTOMY      WISDOM TOOTH EXTRACTION         Review of patient's allergies indicates:   Allergen Reactions    Imitrex [sumatriptan]      stroke    Adhesive tape-silicones     Aloe vera latex gloves [gloves, latex with aloe vera]     Penicillin Hives    Penicillins     Latex Itching and Rash       Outpatient Medications Marked as Taking for the 5/15/23 encounter (Office Visit) with Bharath Kelley MD   Medication Sig Dispense Refill    albuterol sulfate (VENTOLIN INHL) Inhale into the lungs.      amitriptyline (ELAVIL) 10 MG tablet Take 10 mg by mouth nightly.      atogepant 10 mg Tab Take 10 mg by mouth once daily.      ferrous sulfate (FEOSOL) Tab tablet Take 1 tablet by mouth daily with breakfast.      FLOVENT  mcg/actuation inhaler Inhale 2 puffs into the lungs 2 (two) times daily.      fluconazole (DIFLUCAN) 150 MG Tab Take 1 tablet by mouth once daily at 6am. As needede      isoniazid (NYDRAZID) 300 MG Tab Take  "300 mg by mouth once daily.      lisdexamfetamine (VYVANSE) 20 MG capsule Take 1 capsule (20 mg total) by mouth every morning. 30 capsule 0    LO LOESTRIN FE 1 mg-10 mcg (24)/10 mcg (2) Tab Take 1 tablet by mouth once daily.      selenium 50 mcg Tab Take 50 mcg by mouth once daily.         Social History     Socioeconomic History    Marital status: Single   Occupational History    Occupation: Behavioral  therapy   Tobacco Use    Smoking status: Never    Smokeless tobacco: Never   Substance and Sexual Activity    Alcohol use: Not Currently    Drug use: Never    Sexual activity: Yes     Partners: Male     Birth control/protection: OCP        Family History   Problem Relation Age of Onset    Vision loss Mother     Mental illness Father     Hyperlipidemia Father     Hypertension Father     Depression Maternal Grandmother     Cancer Paternal Grandfather     Learning disabilities Brother         Subjective:     Review of Systems   Constitutional:  Negative for chills, fever and malaise/fatigue.   HENT:  Negative for congestion, ear discharge, ear pain, sinus pain and sore throat.    Respiratory:  Negative for cough, sputum production, shortness of breath, wheezing and stridor.    Gastrointestinal:  Positive for nausea. Negative for abdominal pain, diarrhea and vomiting.   Genitourinary:  Negative for dysuria, frequency and urgency.        Difficulty swallowing   Musculoskeletal:  Negative for neck pain.   Skin:  Negative for rash.   Neurological:  Positive for sensory change (Numbness and lips, hands, and throughout body). Negative for headaches.        Lightheaded     Objective:     /89   Pulse 85   Temp 98.4 °F (36.9 °C)   Resp 18   Ht 5' 2" (1.575 m)   Wt 49.4 kg (109 lb)   SpO2 98%   BMI 19.94 kg/m²     Physical Exam  Constitutional:       General: She is not in acute distress.     Appearance: Normal appearance. She is not ill-appearing or toxic-appearing.   HENT:      Head: Normocephalic and " atraumatic.      Right Ear: Tympanic membrane and ear canal normal.      Left Ear: Tympanic membrane and ear canal normal.      Nose: No congestion or rhinorrhea.      Mouth/Throat:      Pharynx: Oropharynx is clear. No oropharyngeal exudate or posterior oropharyngeal erythema.   Eyes:      General:         Right eye: No discharge.         Left eye: No discharge.      Extraocular Movements: Extraocular movements intact.      Conjunctiva/sclera: Conjunctivae normal.      Pupils: Pupils are equal, round, and reactive to light.   Cardiovascular:      Rate and Rhythm: Normal rate and regular rhythm.      Heart sounds: Normal heart sounds. No murmur heard.    No gallop.   Pulmonary:      Effort: Pulmonary effort is normal. No respiratory distress.      Breath sounds: Normal breath sounds. No stridor. No wheezing, rhonchi or rales.   Chest:      Chest wall: No tenderness.   Musculoskeletal:      Cervical back: No rigidity or tenderness.   Lymphadenopathy:      Cervical: No cervical adenopathy.   Neurological:      General: No focal deficit present.      Mental Status: She is alert and oriented to person, place, and time. Mental status is at baseline.      Cranial Nerves: No cranial nerve deficit.      Sensory: No sensory deficit.      Motor: No weakness.      Coordination: Coordination normal.      Gait: Gait normal.      Deep Tendon Reflexes: Reflexes normal.      Comments: Normal neurological exam   Psychiatric:         Mood and Affect: Mood normal.         Behavior: Behavior normal.       Assessment & Plan:       ICD-10-CM ICD-9-CM   1. Medication side effect  T88.7XXA 995.20        1. Medication side effect         Had a long discussion about the uncertain etiology of her problems.  She is not in acute distress today, and physical exam is normal.  All of her medications for run through the up-to-date drug interactions database and no problem for found.  This likely means that the problem is stemming from isoniazid or  Vyvanse.  Her Vyvanse prescribe her told her that it likely was not Vyvanse, and her tuberculosis provider has not called her back.    B6 related isoniazid neuropathy usually takes months, but there are cases for as little as 10 days for symptoms develop.  We discussed this, and recommended that she follow-up with her tuberculosis provider about this as well as begin supplementing with 25-50 mg of B6 daily.  We discussed that, if this is the culprit, it can take months for symptoms to resolve.  Alternatively, she can discontinue taking Vyvanse for a few days to see if that resolves her symptoms.    Patient was reassured and all questions were answered

## 2023-05-15 NOTE — TELEPHONE ENCOUNTER
"Pt states she had cellulitis last week and her last day of taking prednisone was today. She says that she is also taking antibiotics for TB and this morning after she took her Vyvanse, she had a coffee. She said that after finishing the coffee her lips and hands started to feel numb, she was seeing stars, wanted to pass out and "it felt like she was drunk after taking 10 shots".   Pt doesn't know if this is a side effect from the Vyvanse or from any other medication she is on or if it were too much caffeine. She did say that she's been on the vyvanse for the past week and today was the first day she experienced this.   Pt stated she did have to leave work today due to these symptoms and wasn't sure if she good get a work excuse for having to leave.    Please advise   "

## 2023-06-05 ENCOUNTER — OFFICE VISIT (OUTPATIENT)
Dept: BEHAVIORAL HEALTH | Facility: CLINIC | Age: 23
End: 2023-06-05
Payer: OTHER MISCELLANEOUS

## 2023-06-05 VITALS
HEART RATE: 84 BPM | BODY MASS INDEX: 19.74 KG/M2 | SYSTOLIC BLOOD PRESSURE: 121 MMHG | WEIGHT: 107.88 LBS | DIASTOLIC BLOOD PRESSURE: 90 MMHG | OXYGEN SATURATION: 100 % | TEMPERATURE: 98 F

## 2023-06-05 DIAGNOSIS — F33.1 MODERATE EPISODE OF RECURRENT MAJOR DEPRESSIVE DISORDER: ICD-10-CM

## 2023-06-05 DIAGNOSIS — F90.2 ATTENTION DEFICIT HYPERACTIVITY DISORDER (ADHD), COMBINED TYPE: Primary | ICD-10-CM

## 2023-06-05 DIAGNOSIS — F41.1 GAD (GENERALIZED ANXIETY DISORDER): ICD-10-CM

## 2023-06-05 PROCEDURE — 99214 OFFICE O/P EST MOD 30 MIN: CPT | Mod: S$PBB,,, | Performed by: STUDENT IN AN ORGANIZED HEALTH CARE EDUCATION/TRAINING PROGRAM

## 2023-06-05 PROCEDURE — 99214 PR OFFICE/OUTPT VISIT, EST, LEVL IV, 30-39 MIN: ICD-10-PCS | Mod: S$PBB,,, | Performed by: STUDENT IN AN ORGANIZED HEALTH CARE EDUCATION/TRAINING PROGRAM

## 2023-06-05 PROCEDURE — 99213 OFFICE O/P EST LOW 20 MIN: CPT | Mod: PBBFAC,PN | Performed by: STUDENT IN AN ORGANIZED HEALTH CARE EDUCATION/TRAINING PROGRAM

## 2023-06-05 RX ORDER — LISDEXAMFETAMINE DIMESYLATE 30 MG/1
30 CAPSULE ORAL EVERY MORNING
Qty: 14 CAPSULE | Refills: 0 | Status: SHIPPED | OUTPATIENT
Start: 2023-06-05 | End: 2023-06-12 | Stop reason: SDUPTHER

## 2023-06-05 NOTE — PROGRESS NOTES
"Outpatient Psychiatry Follow-Up Visit    6/5/2023    Clinical Status of Patient:  Outpatient (Ambulatory)    Chief Complaint:  Oskar Cao is a 22 y.o. female who presents today for follow-up of MDD, SRI, ADHD. Patient last seen for follow-up on 5/8/2023. Met with patient.      Interval History and Content of Current Session:  Interim Events/Subjective Report/Content of Current Session:   Pt reports doing "ok"  overall.  Notes ongoing SE from vyvanse of increased anxiety with panic attacks.  Reports anxious mood.  Sleep "tossing and turning". Appetite decreased, weight stable.  Energy improved, motivation improved.  Endorses irritability, denies hopelessness.  Denies SI/HI/AVH/paranoia, denies plan or desire for self harm or harm to others.  . Denies somatic complaints.   Pt voices desire to adjust regimen to address ongoing symptoms.      Review of Systems   PSYCHIATRIC: Pertinant items are noted in the narrative.  CONSTITUTIONAL: No weight gain or loss.  MUSCULOSKELETAL: No pain or stiffness of the joints.  NEUROLOGIC: No weakness, sensory changes, seizures, confusion, memory loss, tremor or other abnormal movements.  CARDIAC: No CP, no palpitations  RESPIRATORY: No shortness of breath.  CARDIOVASCULAR: No tachycardia or chest pain.  GASTROINTESTINAL: No nausea, vomiting, pain, constipation or diarrhea.    Past Medical, Family and Social History: The patient's past medical, family and social history have been reviewed and updated as appropriate within the electronic medical record - see encounter notes.    Compliance: good    Side effects: increased anxiety from vyvanse    Risk Parameters:  Patient reports no suicidal ideation  Patient reports no homicidal ideation  Patient reports no self-injurious behavior  Patient reports no violent behavior    Exam (detailed: at least 9 elements; comprehensive: all 15 elements)   Constitutional  Vitals:  Most recent vital signs, dated less than 90 days prior to this " "appointment, were reviewed.     Vitals:    06/05/23 1539   BP: (!) 121/90   Pulse: 84   Temp: 97.8 °F (36.6 °C)   SpO2: 100%   Weight: 48.9 kg (107 lb 14.4 oz)        General:   Constitutional: No acute distress, appears stated age, casually dressed    Neurologic:   Motor: moves all extremities spontaneously and without difficulty, no abnormal involuntary movements observed  Gait: normal gait and station    Mental status examination:   Appearance: appears stated age, casually dressed, no acute distress  Behavior: unremarkable for situation, calm and cooperative  Mood: "ok"  Affect: mood congruent and constricted  Thought process: linear and goal directed  Associations: appropriate for conversation  Thought content: no plan or desire for self harm or harm to others, denies paranoia, no delusional ideation volunteered  Perceptions: denies hallucinations or other altered perceptions  Orientation: oriented to day of week, month, year, location, and situation  Language: English, fluid  Attention: able to attend to interview  Insight: good  Judgement: good    PHQ9 6/5/2023   Total Score 16     GAD7 6/5/2023 4/21/2023   1. Feeling nervous, anxious, or on edge? 3 3   2. Not being able to stop or control worrying? 3 3   3. Worrying too much about different things? 3 3   4. Trouble relaxing? 3 3   5. Being so restless that it is hard to sit still? 3 3   6. Becoming easily annoyed or irritable? 3 3   7. Feeling afraid as if something awful might happen? 1 3   8. If you checked off any problems, how difficult have these problems made it for you to do your work, take care of things at home, or get along with other people? 3 3   SRI-7 Score 19 21     Assessment and Diagnosis   Status/Progress: Based on the examination today, the patient's problem(s) is/are inadequately controlled.  New problems have been presented today (SE increased anxiety).   Co-morbidities and Lack of compliance are not complicating management of the primary " condition.  Number of separate conditions addressed during today's visit: 3 (mood worsening, anxiety worsening, ADHD inadequately controlled).  Are medication adjustments being made today: Yes.  Are referral(s) being ordered today: No.  Complexity (level) of medical decision making employed in the encounter: HIGH.    General Impression:    ICD-10-CM ICD-9-CM   1. Attention deficit hyperactivity disorder (ADHD), combined type  F90.2 314.01   2. Moderate episode of recurrent major depressive disorder  F33.1 296.32   3. SRI (generalized anxiety disorder)  F41.1 300.02     Intervention/Counseling/Treatment Plan   Will trial increase vyvanse to 30mg daily but if SE continue unchanged or worsen, will plan to change to long-acting methylphenidate, pt to call clinic in 1 wk to report response to dose adjustment  Recent labwork in EMR reviewed, CBC and CMP wnl, TSH 2 yrs ago wnl, quantiferon gold + (in treatment with ID)  Discussed that she should inform MD if medication adjustments are needed due to treatment regimen for latent TB  UDS negative at initial visit  No need for PEC as pt is not an imminent danger to self or others or gravely disabled due to acute psychiatric illness  Discussed that pt should either call clinic for psychiatric crisis symptoms or present to nearest emergency room    Discussed with patient informed consent including diagnosis, risks and benefits of proposed treatment above vs. alternative treatments vs. no treatment, as well as serious and common side effects of these treatments, and the inherent unpredictability of individual responses to these treatments. The patient expresses understanding of the above and displays the capacity to agree with this current plan. Patient also agrees that, currently, the benefits outweigh the risks and would like to pursue treatment at this time, and had no other questions.    Instructions:  Take all medications as prescribed.    Abstain from recreational drugs and  alcohol.  Present to ED or call 911 for SI/HI plan or intent, psychosis, or medical emergency.    Return to Clinic: Follow up in about 6 weeks (around 7/17/2023).    Total time:   The total time for services performed on the date of the encounter (including review of prior visit notes, review of notes from other providers, review of results from laboratory/imaging studies, face-to-face time with patient, and time spent on other activities directly related to patient care): 25 minutes.    Baldomero Ahuja MD  [facility]

## 2023-06-12 ENCOUNTER — PATIENT MESSAGE (OUTPATIENT)
Dept: BEHAVIORAL HEALTH | Facility: CLINIC | Age: 23
End: 2023-06-12
Payer: COMMERCIAL

## 2023-06-12 DIAGNOSIS — F90.2 ATTENTION DEFICIT HYPERACTIVITY DISORDER (ADHD), COMBINED TYPE: Primary | ICD-10-CM

## 2023-06-12 RX ORDER — DEXTROAMPHETAMINE SACCHARATE, AMPHETAMINE ASPARTATE, DEXTROAMPHETAMINE SULFATE AND AMPHETAMINE SULFATE 1.25; 1.25; 1.25; 1.25 MG/1; MG/1; MG/1; MG/1
5 TABLET ORAL DAILY
Qty: 30 TABLET | Refills: 0 | Status: SHIPPED | OUTPATIENT
Start: 2023-06-12 | End: 2023-07-10 | Stop reason: SDUPTHER

## 2023-06-12 RX ORDER — LISDEXAMFETAMINE DIMESYLATE CAPSULES 20 MG/1
20 CAPSULE ORAL EVERY MORNING
Qty: 30 CAPSULE | Refills: 0 | Status: SHIPPED | OUTPATIENT
Start: 2023-06-12 | End: 2023-07-10 | Stop reason: SDUPTHER

## 2023-06-12 NOTE — TELEPHONE ENCOUNTER
"Called pt to discuss medication response.  She confirmed vyvanse at 29 caused her to "crash" with nausea and vomiting.  Prefers vyvanse over adderall and prefers lower dose of vyvanse.  Will decrease dose back to 20mg daily and add on adderall IR 5mg daily for afternoon coverage.    "

## 2023-07-10 DIAGNOSIS — F90.2 ATTENTION DEFICIT HYPERACTIVITY DISORDER (ADHD), COMBINED TYPE: Primary | ICD-10-CM

## 2023-07-10 RX ORDER — LISDEXAMFETAMINE DIMESYLATE CAPSULES 20 MG/1
20 CAPSULE ORAL EVERY MORNING
Qty: 30 CAPSULE | Refills: 0 | Status: SHIPPED | OUTPATIENT
Start: 2023-07-11 | End: 2023-07-27 | Stop reason: SDUPTHER

## 2023-07-10 RX ORDER — DEXTROAMPHETAMINE SACCHARATE, AMPHETAMINE ASPARTATE, DEXTROAMPHETAMINE SULFATE AND AMPHETAMINE SULFATE 1.25; 1.25; 1.25; 1.25 MG/1; MG/1; MG/1; MG/1
5 TABLET ORAL DAILY
Qty: 30 TABLET | Refills: 0 | Status: SHIPPED | OUTPATIENT
Start: 2023-07-11 | End: 2023-07-27 | Stop reason: SDUPTHER

## 2023-07-27 ENCOUNTER — OFFICE VISIT (OUTPATIENT)
Dept: BEHAVIORAL HEALTH | Facility: CLINIC | Age: 23
End: 2023-07-27
Payer: COMMERCIAL

## 2023-07-27 VITALS
TEMPERATURE: 98 F | OXYGEN SATURATION: 99 % | WEIGHT: 107.81 LBS | BODY MASS INDEX: 19.72 KG/M2 | DIASTOLIC BLOOD PRESSURE: 84 MMHG | SYSTOLIC BLOOD PRESSURE: 128 MMHG

## 2023-07-27 DIAGNOSIS — F90.2 ATTENTION DEFICIT HYPERACTIVITY DISORDER (ADHD), COMBINED TYPE: Primary | ICD-10-CM

## 2023-07-27 DIAGNOSIS — F33.1 MODERATE EPISODE OF RECURRENT MAJOR DEPRESSIVE DISORDER: ICD-10-CM

## 2023-07-27 DIAGNOSIS — F41.1 GAD (GENERALIZED ANXIETY DISORDER): ICD-10-CM

## 2023-07-27 PROCEDURE — 3008F PR BODY MASS INDEX (BMI) DOCUMENTED: ICD-10-PCS | Mod: CPTII,,, | Performed by: STUDENT IN AN ORGANIZED HEALTH CARE EDUCATION/TRAINING PROGRAM

## 2023-07-27 PROCEDURE — 1159F MED LIST DOCD IN RCRD: CPT | Mod: CPTII,,, | Performed by: STUDENT IN AN ORGANIZED HEALTH CARE EDUCATION/TRAINING PROGRAM

## 2023-07-27 PROCEDURE — 3079F DIAST BP 80-89 MM HG: CPT | Mod: CPTII,,, | Performed by: STUDENT IN AN ORGANIZED HEALTH CARE EDUCATION/TRAINING PROGRAM

## 2023-07-27 PROCEDURE — 99213 PR OFFICE/OUTPT VISIT, EST, LEVL III, 20-29 MIN: ICD-10-PCS | Mod: S$PBB,,, | Performed by: STUDENT IN AN ORGANIZED HEALTH CARE EDUCATION/TRAINING PROGRAM

## 2023-07-27 PROCEDURE — 1160F RVW MEDS BY RX/DR IN RCRD: CPT | Mod: CPTII,,, | Performed by: STUDENT IN AN ORGANIZED HEALTH CARE EDUCATION/TRAINING PROGRAM

## 2023-07-27 PROCEDURE — 3074F PR MOST RECENT SYSTOLIC BLOOD PRESSURE < 130 MM HG: ICD-10-PCS | Mod: CPTII,,, | Performed by: STUDENT IN AN ORGANIZED HEALTH CARE EDUCATION/TRAINING PROGRAM

## 2023-07-27 PROCEDURE — 1160F PR REVIEW ALL MEDS BY PRESCRIBER/CLIN PHARMACIST DOCUMENTED: ICD-10-PCS | Mod: CPTII,,, | Performed by: STUDENT IN AN ORGANIZED HEALTH CARE EDUCATION/TRAINING PROGRAM

## 2023-07-27 PROCEDURE — 3008F BODY MASS INDEX DOCD: CPT | Mod: CPTII,,, | Performed by: STUDENT IN AN ORGANIZED HEALTH CARE EDUCATION/TRAINING PROGRAM

## 2023-07-27 PROCEDURE — 1159F PR MEDICATION LIST DOCUMENTED IN MEDICAL RECORD: ICD-10-PCS | Mod: CPTII,,, | Performed by: STUDENT IN AN ORGANIZED HEALTH CARE EDUCATION/TRAINING PROGRAM

## 2023-07-27 PROCEDURE — 3074F SYST BP LT 130 MM HG: CPT | Mod: CPTII,,, | Performed by: STUDENT IN AN ORGANIZED HEALTH CARE EDUCATION/TRAINING PROGRAM

## 2023-07-27 PROCEDURE — 99213 OFFICE O/P EST LOW 20 MIN: CPT | Mod: S$PBB,,, | Performed by: STUDENT IN AN ORGANIZED HEALTH CARE EDUCATION/TRAINING PROGRAM

## 2023-07-27 PROCEDURE — 3079F PR MOST RECENT DIASTOLIC BLOOD PRESSURE 80-89 MM HG: ICD-10-PCS | Mod: CPTII,,, | Performed by: STUDENT IN AN ORGANIZED HEALTH CARE EDUCATION/TRAINING PROGRAM

## 2023-07-27 PROCEDURE — 99213 OFFICE O/P EST LOW 20 MIN: CPT | Mod: PBBFAC,PN | Performed by: STUDENT IN AN ORGANIZED HEALTH CARE EDUCATION/TRAINING PROGRAM

## 2023-07-27 RX ORDER — LISDEXAMFETAMINE DIMESYLATE CAPSULES 20 MG/1
20 CAPSULE ORAL EVERY MORNING
Qty: 30 CAPSULE | Refills: 0 | Status: SHIPPED | OUTPATIENT
Start: 2023-08-10

## 2023-07-27 RX ORDER — DEXTROAMPHETAMINE SACCHARATE, AMPHETAMINE ASPARTATE, DEXTROAMPHETAMINE SULFATE AND AMPHETAMINE SULFATE 1.25; 1.25; 1.25; 1.25 MG/1; MG/1; MG/1; MG/1
5 TABLET ORAL DAILY
Qty: 30 TABLET | Refills: 0 | Status: SHIPPED | OUTPATIENT
Start: 2023-10-08

## 2023-07-27 RX ORDER — LISDEXAMFETAMINE DIMESYLATE CAPSULES 20 MG/1
20 CAPSULE ORAL EVERY MORNING
Qty: 30 CAPSULE | Refills: 0 | Status: SHIPPED | OUTPATIENT
Start: 2023-10-08

## 2023-07-27 RX ORDER — LISDEXAMFETAMINE DIMESYLATE CAPSULES 20 MG/1
20 CAPSULE ORAL EVERY MORNING
Qty: 30 CAPSULE | Refills: 0 | Status: SHIPPED | OUTPATIENT
Start: 2023-09-09

## 2023-07-27 RX ORDER — DEXTROAMPHETAMINE SACCHARATE, AMPHETAMINE ASPARTATE, DEXTROAMPHETAMINE SULFATE AND AMPHETAMINE SULFATE 1.25; 1.25; 1.25; 1.25 MG/1; MG/1; MG/1; MG/1
5 TABLET ORAL DAILY
Qty: 30 TABLET | Refills: 0 | Status: SHIPPED | OUTPATIENT
Start: 2023-09-09

## 2023-07-27 RX ORDER — DEXTROAMPHETAMINE SACCHARATE, AMPHETAMINE ASPARTATE, DEXTROAMPHETAMINE SULFATE AND AMPHETAMINE SULFATE 1.25; 1.25; 1.25; 1.25 MG/1; MG/1; MG/1; MG/1
5 TABLET ORAL DAILY
Qty: 30 TABLET | Refills: 0 | Status: SHIPPED | OUTPATIENT
Start: 2023-08-10

## 2023-07-27 NOTE — PROGRESS NOTES
"Outpatient Psychiatry Follow-Up Visit    7/27/2023    Clinical Status of Patient:  Outpatient (Ambulatory)    Chief Complaint:  Oskar Cao is a 22 y.o. female who presents today for follow-up of MDD, SRI, ADHD. Patient last seen for follow-up on 6/5/2023. Met with patient.      Interval History and Content of Current Session:  Interim Events/Subjective Report/Content of Current Session:   Pt reports doing "ok"  overall.  Notes vyvanse + adderall is working well but feels she overfocuses at times.  Somestimes has difficult time switching tasks.  Now on break from school and feels like she's improving.  Reports good mood, good anxiety.  Sleep fair. Appetite good, weight good.  Energy good, motivation good.  Endorses irritability when stimulants wear off, denies hopelessness.  Denies SI/HI/AVH/paranoia, denies plan or desire for self harm or harm to others.  Denies SE from current regimen. Denies somatic complaints.   Pt happy with current regimen and wants to continue.     Review of Systems   PSYCHIATRIC: Pertinant items are noted in the narrative.  CONSTITUTIONAL: No weight gain or loss.  MUSCULOSKELETAL: No pain or stiffness of the joints.  NEUROLOGIC: No weakness, sensory changes, seizures, confusion, memory loss, tremor or other abnormal movements.  CARDIAC: No CP, no palpitations  RESPIRATORY: No shortness of breath.  CARDIOVASCULAR: No tachycardia or chest pain.  GASTROINTESTINAL: No nausea, vomiting, pain, constipation or diarrhea.    Past Medical, Family and Social History: The patient's past medical, family and social history have been reviewed and updated as appropriate within the electronic medical record - see encounter notes.    Compliance: good    Side effects: increased anxiety from vyvanse    Risk Parameters:  Patient reports no suicidal ideation  Patient reports no homicidal ideation  Patient reports no self-injurious behavior  Patient reports no violent behavior    Exam (detailed: at least 9 " "elements; comprehensive: all 15 elements)   Constitutional  Vitals:  Most recent vital signs, dated less than 90 days prior to this appointment, were reviewed.     Vitals:    07/27/23 0817   BP: 128/84   Temp: 97.9 °F (36.6 °C)   SpO2: 99%   Weight: 48.9 kg (107 lb 12.8 oz)        General:   Constitutional: No acute distress, appears stated age, casually dressed    Neurologic:   Motor: moves all extremities spontaneously and without difficulty, no abnormal involuntary movements observed  Gait: normal gait and station    Mental status examination:   Appearance: appears stated age, casually dressed, no acute distress  Behavior: unremarkable for situation, calm and cooperative  Mood: "ok"  Affect: mood congruent and constricted  Thought process: linear and goal directed  Associations: appropriate for conversation  Thought content: no plan or desire for self harm or harm to others, denies paranoia, no delusional ideation volunteered  Perceptions: denies hallucinations or other altered perceptions  Orientation: oriented to day of week, month, year, location, and situation  Language: English, fluid  Attention: able to attend to interview  Insight: good  Judgement: good    PHQ9 7/27/2023   Total Score 0     GAD7 7/27/2023 6/5/2023 4/21/2023   1. Feeling nervous, anxious, or on edge? 0 3 3   2. Not being able to stop or control worrying? 0 3 3   3. Worrying too much about different things? 0 3 3   4. Trouble relaxing? 0 3 3   5. Being so restless that it is hard to sit still? 0 3 3   6. Becoming easily annoyed or irritable? 0 3 3   7. Feeling afraid as if something awful might happen? 0 1 3   8. If you checked off any problems, how difficult have these problems made it for you to do your work, take care of things at home, or get along with other people? 0 3 3   SRI-7 Score 0 19 21     Assessment and Diagnosis   Status/Progress: Based on the examination today, the patient's problem(s) is/are well controlled.  New problems " have not been presented today.   Co-morbidities and Lack of compliance are not complicating management of the primary condition.  Number of separate conditions addressed during today's visit: 3 (ADHD well controlled, anxiety improved, mood improved).  Are medication adjustments being made today: No.  Are referral(s) being ordered today: No.  Complexity (level) of medical decision making employed in the encounter: MODERATE.    General Impression:    ICD-10-CM ICD-9-CM   1. Attention deficit hyperactivity disorder (ADHD), combined type  F90.2 314.01   2. Moderate episode of recurrent major depressive disorder  F33.1 296.32   3. SRI (generalized anxiety disorder)  F41.1 300.02     Intervention/Counseling/Treatment Plan   Continue vyvanse 20mg daily   Continue adderall 5mg daily  Continue elavil 10mg nightly  Recent labwork in EMR reviewed, CBC and CMP wnl, TSH 2 yrs ago wnl, quantiferon gold + (in treatment with ID)  UDS negative at initial visit  No need for PEC as pt is not an imminent danger to self or others or gravely disabled due to acute psychiatric illness  Discussed that pt should either call clinic for psychiatric crisis symptoms or present to nearest emergency room    Discussed with patient informed consent including diagnosis, risks and benefits of proposed treatment above vs. alternative treatments vs. no treatment, as well as serious and common side effects of these treatments, and the inherent unpredictability of individual responses to these treatments. The patient expresses understanding of the above and displays the capacity to agree with this current plan. Patient also agrees that, currently, the benefits outweigh the risks and would like to pursue treatment at this time, and had no other questions.    Instructions:  Take all medications as prescribed.    Abstain from recreational drugs and alcohol.  Present to ED or call 911 for SI/HI plan or intent, psychosis, or medical emergency.    Return to  Clinic: Follow up in about 3 months (around 10/27/2023).    Total time:   The total time for services performed on the date of the encounter (including review of prior visit notes, review of notes from other providers, review of results from laboratory/imaging studies, face-to-face time with patient, and time spent on other activities directly related to patient care): 15 minutes.    Baldomero Ahuja MD  [facility]

## 2023-10-17 ENCOUNTER — PATIENT MESSAGE (OUTPATIENT)
Dept: BEHAVIORAL HEALTH | Facility: CLINIC | Age: 23
End: 2023-10-17
Payer: COMMERCIAL